# Patient Record
Sex: MALE | Race: WHITE | NOT HISPANIC OR LATINO
[De-identification: names, ages, dates, MRNs, and addresses within clinical notes are randomized per-mention and may not be internally consistent; named-entity substitution may affect disease eponyms.]

---

## 2017-01-18 ENCOUNTER — APPOINTMENT (OUTPATIENT)
Dept: GASTROENTEROLOGY | Facility: CLINIC | Age: 42
End: 2017-01-18

## 2017-02-24 ENCOUNTER — MEDICATION RENEWAL (OUTPATIENT)
Age: 42
End: 2017-02-24

## 2017-02-24 ENCOUNTER — TRANSCRIPTION ENCOUNTER (OUTPATIENT)
Age: 42
End: 2017-02-24

## 2017-03-16 ENCOUNTER — RX RENEWAL (OUTPATIENT)
Age: 42
End: 2017-03-16

## 2017-04-19 ENCOUNTER — TRANSCRIPTION ENCOUNTER (OUTPATIENT)
Age: 42
End: 2017-04-19

## 2017-04-19 ENCOUNTER — MEDICATION RENEWAL (OUTPATIENT)
Age: 42
End: 2017-04-19

## 2017-06-26 ENCOUNTER — TRANSCRIPTION ENCOUNTER (OUTPATIENT)
Age: 42
End: 2017-06-26

## 2017-06-27 ENCOUNTER — MEDICATION RENEWAL (OUTPATIENT)
Age: 42
End: 2017-06-27

## 2017-09-26 ENCOUNTER — RESULT REVIEW (OUTPATIENT)
Age: 42
End: 2017-09-26

## 2017-09-29 ENCOUNTER — APPOINTMENT (OUTPATIENT)
Dept: INTERNAL MEDICINE | Facility: CLINIC | Age: 42
End: 2017-09-29
Payer: COMMERCIAL

## 2017-09-29 VITALS
WEIGHT: 218 LBS | BODY MASS INDEX: 30.52 KG/M2 | HEIGHT: 71 IN | OXYGEN SATURATION: 97 % | HEART RATE: 80 BPM | TEMPERATURE: 97.6 F | SYSTOLIC BLOOD PRESSURE: 130 MMHG | DIASTOLIC BLOOD PRESSURE: 90 MMHG

## 2017-09-29 VITALS — SYSTOLIC BLOOD PRESSURE: 130 MMHG | DIASTOLIC BLOOD PRESSURE: 90 MMHG

## 2017-09-29 DIAGNOSIS — Z82.49 FAMILY HISTORY OF ISCHEMIC HEART DISEASE AND OTHER DISEASES OF THE CIRCULATORY SYSTEM: ICD-10-CM

## 2017-09-29 DIAGNOSIS — Z78.9 OTHER SPECIFIED HEALTH STATUS: ICD-10-CM

## 2017-09-29 DIAGNOSIS — K43.9 VENTRAL HERNIA W/OUT OBSTRUCTION OR GANGRENE: ICD-10-CM

## 2017-09-29 DIAGNOSIS — Z80.42 FAMILY HISTORY OF MALIGNANT NEOPLASM OF PROSTATE: ICD-10-CM

## 2017-09-29 LAB — SAVE SPECIMEN: NORMAL

## 2017-09-29 PROCEDURE — 94010 BREATHING CAPACITY TEST: CPT

## 2017-09-29 PROCEDURE — 90472 IMMUNIZATION ADMIN EACH ADD: CPT

## 2017-09-29 PROCEDURE — 99396 PREV VISIT EST AGE 40-64: CPT | Mod: 25

## 2017-09-29 PROCEDURE — 36415 COLL VENOUS BLD VENIPUNCTURE: CPT

## 2017-09-29 PROCEDURE — 90715 TDAP VACCINE 7 YRS/> IM: CPT

## 2017-09-29 PROCEDURE — G0008: CPT

## 2017-09-29 PROCEDURE — 90686 IIV4 VACC NO PRSV 0.5 ML IM: CPT

## 2017-09-29 PROCEDURE — 93000 ELECTROCARDIOGRAM COMPLETE: CPT

## 2017-10-03 ENCOUNTER — TRANSCRIPTION ENCOUNTER (OUTPATIENT)
Age: 42
End: 2017-10-03

## 2017-10-03 ENCOUNTER — RX RENEWAL (OUTPATIENT)
Age: 42
End: 2017-10-03

## 2017-10-03 ENCOUNTER — RESULT REVIEW (OUTPATIENT)
Age: 42
End: 2017-10-03

## 2017-10-03 LAB
25(OH)D3 SERPL-MCNC: 38.4 NG/ML
ALBUMIN SERPL ELPH-MCNC: 4.5 G/DL
ALP BLD-CCNC: 67 U/L
ALT SERPL-CCNC: 20 U/L
ANION GAP SERPL CALC-SCNC: 16 MMOL/L
APPEARANCE: CLEAR
AST SERPL-CCNC: 24 U/L
BACTERIA: NEGATIVE
BASOPHILS # BLD AUTO: 0.01 K/UL
BASOPHILS NFR BLD AUTO: 0.2 %
BILIRUB SERPL-MCNC: 0.6 MG/DL
BILIRUBIN URINE: NEGATIVE
BLOOD URINE: NEGATIVE
BUN SERPL-MCNC: 14 MG/DL
CALCIUM SERPL-MCNC: 10 MG/DL
CHLORIDE SERPL-SCNC: 98 MMOL/L
CHOLEST SERPL-MCNC: 186 MG/DL
CHOLEST/HDLC SERPL: 2.7 RATIO
CO2 SERPL-SCNC: 26 MMOL/L
COLOR: YELLOW
CREAT SERPL-MCNC: 1.18 MG/DL
EOSINOPHIL # BLD AUTO: 0.15 K/UL
EOSINOPHIL NFR BLD AUTO: 2.6 %
GLUCOSE QUALITATIVE U: NORMAL MG/DL
GLUCOSE SERPL-MCNC: 82 MG/DL
HCT VFR BLD CALC: 48.5 %
HDLC SERPL-MCNC: 68 MG/DL
HGB BLD-MCNC: 16.3 G/DL
IMM GRANULOCYTES NFR BLD AUTO: 0.2 %
KETONES URINE: NEGATIVE
LDLC SERPL CALC-MCNC: 85 MG/DL
LDLC SERPL DIRECT ASSAY-MCNC: 86 MG/DL
LEUKOCYTE ESTERASE URINE: NEGATIVE
LYMPHOCYTES # BLD AUTO: 1.73 K/UL
LYMPHOCYTES NFR BLD AUTO: 30.5 %
MAN DIFF?: NORMAL
MCHC RBC-ENTMCNC: 30.4 PG
MCHC RBC-ENTMCNC: 33.6 GM/DL
MCV RBC AUTO: 90.5 FL
MICROSCOPIC-UA: NORMAL
MONOCYTES # BLD AUTO: 0.53 K/UL
MONOCYTES NFR BLD AUTO: 9.3 %
NEUTROPHILS # BLD AUTO: 3.25 K/UL
NEUTROPHILS NFR BLD AUTO: 57.2 %
NITRITE URINE: NEGATIVE
PH URINE: 5.5
PLATELET # BLD AUTO: 241 K/UL
POTASSIUM SERPL-SCNC: 4.2 MMOL/L
PROT SERPL-MCNC: 8.1 G/DL
PROTEIN URINE: NEGATIVE MG/DL
PSA SERPL-MCNC: 1.05 NG/ML
RBC # BLD: 5.36 M/UL
RBC # FLD: 13.3 %
RED BLOOD CELLS URINE: 4 /HPF
SODIUM SERPL-SCNC: 140 MMOL/L
SPECIFIC GRAVITY URINE: 1.02
SQUAMOUS EPITHELIAL CELLS: 0 /HPF
T4 FREE SERPL-MCNC: 1.9 NG/DL
TRIGL SERPL-MCNC: 163 MG/DL
TSH SERPL-ACNC: 0.16 UIU/ML
UROBILINOGEN URINE: NORMAL MG/DL
WBC # FLD AUTO: 5.68 K/UL
WHITE BLOOD CELLS URINE: 1 /HPF

## 2017-11-27 ENCOUNTER — TRANSCRIPTION ENCOUNTER (OUTPATIENT)
Age: 42
End: 2017-11-27

## 2017-11-28 ENCOUNTER — RX RENEWAL (OUTPATIENT)
Age: 42
End: 2017-11-28

## 2017-12-22 ENCOUNTER — APPOINTMENT (OUTPATIENT)
Dept: INTERNAL MEDICINE | Facility: CLINIC | Age: 42
End: 2017-12-22
Payer: COMMERCIAL

## 2017-12-22 ENCOUNTER — LABORATORY RESULT (OUTPATIENT)
Age: 42
End: 2017-12-22

## 2017-12-22 VITALS
BODY MASS INDEX: 31.92 KG/M2 | DIASTOLIC BLOOD PRESSURE: 88 MMHG | WEIGHT: 228 LBS | SYSTOLIC BLOOD PRESSURE: 122 MMHG | HEIGHT: 71 IN | OXYGEN SATURATION: 98 % | TEMPERATURE: 97.5 F | HEART RATE: 76 BPM

## 2017-12-22 VITALS — DIASTOLIC BLOOD PRESSURE: 90 MMHG | SYSTOLIC BLOOD PRESSURE: 130 MMHG

## 2017-12-22 DIAGNOSIS — J45.909 UNSPECIFIED ASTHMA, UNCOMPLICATED: ICD-10-CM

## 2017-12-22 DIAGNOSIS — R20.2 PARESTHESIA OF SKIN: ICD-10-CM

## 2017-12-22 PROCEDURE — 36415 COLL VENOUS BLD VENIPUNCTURE: CPT

## 2017-12-22 PROCEDURE — 99214 OFFICE O/P EST MOD 30 MIN: CPT | Mod: 25

## 2017-12-22 RX ORDER — ALBUTEROL SULFATE 90 UG/1
108 (90 BASE) AEROSOL, METERED RESPIRATORY (INHALATION)
Qty: 8 | Refills: 0 | Status: ACTIVE | COMMUNITY
Start: 2017-08-27

## 2017-12-27 RX ORDER — LEVOTHYROXINE SODIUM 0.17 MG/1
175 TABLET ORAL
Qty: 30 | Refills: 0 | Status: DISCONTINUED | COMMUNITY
Start: 2017-09-26 | End: 2017-12-27

## 2018-01-02 LAB
T4 FREE SERPL-MCNC: 1.9 NG/DL
TSH SERPL-ACNC: 0.29 UIU/ML
VIT B12 SERPL-MCNC: 461 PG/ML

## 2018-01-25 PROBLEM — Z92.29 HISTORY OF INFLUENZA VACCINATION: Status: RESOLVED | Noted: 2017-09-29 | Resolved: 2018-01-25

## 2018-01-25 PROBLEM — Z23 NEED FOR TDAP VACCINATION: Status: RESOLVED | Noted: 2017-09-29 | Resolved: 2018-01-25

## 2018-01-26 ENCOUNTER — LABORATORY RESULT (OUTPATIENT)
Age: 43
End: 2018-01-26

## 2018-01-26 ENCOUNTER — APPOINTMENT (OUTPATIENT)
Dept: INTERNAL MEDICINE | Facility: CLINIC | Age: 43
End: 2018-01-26
Payer: COMMERCIAL

## 2018-01-26 VITALS
SYSTOLIC BLOOD PRESSURE: 130 MMHG | BODY MASS INDEX: 31.64 KG/M2 | HEIGHT: 71 IN | OXYGEN SATURATION: 98 % | WEIGHT: 226 LBS | HEART RATE: 90 BPM | TEMPERATURE: 96.9 F | DIASTOLIC BLOOD PRESSURE: 98 MMHG

## 2018-01-26 DIAGNOSIS — Z92.29 PERSONAL HISTORY OF OTHER DRUG THERAPY: ICD-10-CM

## 2018-01-26 DIAGNOSIS — Z23 ENCOUNTER FOR IMMUNIZATION: ICD-10-CM

## 2018-01-26 PROCEDURE — 36415 COLL VENOUS BLD VENIPUNCTURE: CPT

## 2018-01-26 PROCEDURE — 99214 OFFICE O/P EST MOD 30 MIN: CPT | Mod: 25

## 2018-01-26 PROCEDURE — 93000 ELECTROCARDIOGRAM COMPLETE: CPT

## 2018-01-29 LAB
T4 FREE SERPL-MCNC: 1.4 NG/DL
TSH SERPL-ACNC: 2.08 UIU/ML

## 2018-03-02 ENCOUNTER — APPOINTMENT (OUTPATIENT)
Dept: INTERNAL MEDICINE | Facility: CLINIC | Age: 43
End: 2018-03-02

## 2018-07-25 PROBLEM — Z78.9 ALCOHOL USE: Status: ACTIVE | Noted: 2017-09-29

## 2018-08-01 DIAGNOSIS — K29.70 GASTRITIS, UNSPECIFIED, W/OUT BLEEDING: ICD-10-CM

## 2019-01-22 ENCOUNTER — RX RENEWAL (OUTPATIENT)
Age: 44
End: 2019-01-22

## 2019-05-13 ENCOUNTER — TRANSCRIPTION ENCOUNTER (OUTPATIENT)
Age: 44
End: 2019-05-13

## 2019-05-17 ENCOUNTER — OTHER (OUTPATIENT)
Age: 44
End: 2019-05-17

## 2019-05-17 ENCOUNTER — APPOINTMENT (OUTPATIENT)
Dept: INTERNAL MEDICINE | Facility: CLINIC | Age: 44
End: 2019-05-17
Payer: COMMERCIAL

## 2019-05-17 ENCOUNTER — LABORATORY RESULT (OUTPATIENT)
Age: 44
End: 2019-05-17

## 2019-05-17 LAB — SAVE SPECIMEN: NORMAL

## 2019-05-17 PROCEDURE — 36415 COLL VENOUS BLD VENIPUNCTURE: CPT

## 2019-05-20 LAB
25(OH)D3 SERPL-MCNC: 38 NG/ML
ALBUMIN SERPL ELPH-MCNC: 4.6 G/DL
ALP BLD-CCNC: 66 U/L
ALT SERPL-CCNC: 27 U/L
ANION GAP SERPL CALC-SCNC: 16 MMOL/L
APPEARANCE: CLEAR
AST SERPL-CCNC: 23 U/L
BACTERIA: NEGATIVE
BASOPHILS # BLD AUTO: 0.03 K/UL
BASOPHILS NFR BLD AUTO: 0.5 %
BILIRUB SERPL-MCNC: 0.5 MG/DL
BILIRUBIN URINE: NEGATIVE
BLOOD URINE: NEGATIVE
BUN SERPL-MCNC: 19 MG/DL
CALCIUM SERPL-MCNC: 10.1 MG/DL
CHLORIDE SERPL-SCNC: 103 MMOL/L
CHOLEST SERPL-MCNC: 178 MG/DL
CHOLEST/HDLC SERPL: 2.9 RATIO
CO2 SERPL-SCNC: 24 MMOL/L
COLOR: YELLOW
CREAT SERPL-MCNC: 1.14 MG/DL
EOSINOPHIL # BLD AUTO: 0.1 K/UL
EOSINOPHIL NFR BLD AUTO: 1.7 %
GLUCOSE QUALITATIVE U: NEGATIVE
GLUCOSE SERPL-MCNC: 93 MG/DL
HCT VFR BLD CALC: 50.7 %
HDLC SERPL-MCNC: 62 MG/DL
HGB BLD-MCNC: 16.6 G/DL
HYALINE CASTS: 0 /LPF
IMM GRANULOCYTES NFR BLD AUTO: 0.2 %
KETONES URINE: NEGATIVE
LDLC SERPL CALC-MCNC: 88 MG/DL
LEUKOCYTE ESTERASE URINE: NEGATIVE
LYMPHOCYTES # BLD AUTO: 2.42 K/UL
LYMPHOCYTES NFR BLD AUTO: 42.2 %
MAN DIFF?: NORMAL
MCHC RBC-ENTMCNC: 29.5 PG
MCHC RBC-ENTMCNC: 32.7 GM/DL
MCV RBC AUTO: 90.1 FL
MICROSCOPIC-UA: NORMAL
MONOCYTES # BLD AUTO: 0.46 K/UL
MONOCYTES NFR BLD AUTO: 8 %
NEUTROPHILS # BLD AUTO: 2.71 K/UL
NEUTROPHILS NFR BLD AUTO: 47.4 %
NITRITE URINE: NEGATIVE
PH URINE: 6.5
PLATELET # BLD AUTO: 232 K/UL
POTASSIUM SERPL-SCNC: 4.2 MMOL/L
PROT SERPL-MCNC: 7.7 G/DL
PROTEIN URINE: NORMAL
RBC # BLD: 5.63 M/UL
RBC # FLD: 12.4 %
RED BLOOD CELLS URINE: 2 /HPF
SODIUM SERPL-SCNC: 143 MMOL/L
SPECIFIC GRAVITY URINE: 1.03
SQUAMOUS EPITHELIAL CELLS: 0 /HPF
T4 FREE SERPL-MCNC: 1.5 NG/DL
TRIGL SERPL-MCNC: 141 MG/DL
TSH SERPL-ACNC: 1.62 UIU/ML
UROBILINOGEN URINE: NORMAL
WBC # FLD AUTO: 5.73 K/UL
WHITE BLOOD CELLS URINE: 0 /HPF

## 2019-05-23 ENCOUNTER — APPOINTMENT (OUTPATIENT)
Dept: INTERNAL MEDICINE | Facility: CLINIC | Age: 44
End: 2019-05-23
Payer: COMMERCIAL

## 2019-05-23 VITALS
BODY MASS INDEX: 33.32 KG/M2 | TEMPERATURE: 98 F | HEART RATE: 79 BPM | WEIGHT: 238 LBS | DIASTOLIC BLOOD PRESSURE: 92 MMHG | OXYGEN SATURATION: 97 % | SYSTOLIC BLOOD PRESSURE: 130 MMHG | HEIGHT: 71 IN

## 2019-05-23 VITALS — DIASTOLIC BLOOD PRESSURE: 90 MMHG | SYSTOLIC BLOOD PRESSURE: 120 MMHG

## 2019-05-23 DIAGNOSIS — Z11.4 ENCOUNTER FOR SCREENING FOR HUMAN IMMUNODEFICIENCY VIRUS [HIV]: ICD-10-CM

## 2019-05-23 DIAGNOSIS — Z87.898 PERSONAL HISTORY OF OTHER SPECIFIED CONDITIONS: ICD-10-CM

## 2019-05-23 DIAGNOSIS — R03.0 ELEVATED BLOOD-PRESSURE READING, W/OUT DIAGNOSIS OF HYPERTENSION: ICD-10-CM

## 2019-05-23 DIAGNOSIS — Z87.01 PERSONAL HISTORY OF PNEUMONIA (RECURRENT): ICD-10-CM

## 2019-05-23 PROCEDURE — 94010 BREATHING CAPACITY TEST: CPT

## 2019-05-23 PROCEDURE — 93000 ELECTROCARDIOGRAM COMPLETE: CPT

## 2019-05-23 PROCEDURE — 99396 PREV VISIT EST AGE 40-64: CPT | Mod: 25

## 2019-05-23 RX ORDER — RAMIPRIL 5 MG/1
5 CAPSULE ORAL
Qty: 30 | Refills: 0 | Status: DISCONTINUED | COMMUNITY
Start: 2018-01-26 | End: 2019-05-23

## 2019-05-23 RX ORDER — AZITHROMYCIN 250 MG/1
250 TABLET, FILM COATED ORAL
Qty: 6 | Refills: 0 | Status: DISCONTINUED | COMMUNITY
Start: 2018-01-19 | End: 2019-05-23

## 2019-05-23 NOTE — HISTORY OF PRESENT ILLNESS
[FreeTextEntry1] : CPE [de-identified] : vaccine- up to date\par diet- eating healthy- needs to eat more veg\par exercise- no\par HTN- pt has not taken BP meds. \par fatigue- wakes up tired.  pt has ISMAEL- has mandibular device. \par sexual dysfunction- prob w erection- several yr. - no prob w erection w masturbation.\par asthma- no exaccerbation\par dyspnea-at baseline for 3-4 yr.  mainly when walking [place] stairs- when he was more active he was able to climb stairs\par irritable bowel- had abd bloating,  loose bm- went to seen his GI- pt states he was placed on PPI and EGD.  colonscopy 1/2018-nl\par last episode of dizziness - 1-2mo ago

## 2019-05-23 NOTE — PHYSICAL EXAM
[No Acute Distress] : no acute distress [Well Nourished] : well nourished [Well Developed] : well developed [Well-Appearing] : well-appearing [Normal Sclera/Conjunctiva] : normal sclera/conjunctiva [PERRL] : pupils equal round and reactive to light [Normal Outer Ear/Nose] : the outer ears and nose were normal in appearance [Normal Oropharynx] : the oropharynx was normal [Normal TMs] : both tympanic membranes were normal [Supple] : supple [No Lymphadenopathy] : no lymphadenopathy [Thyroid Normal, No Nodules] : the thyroid was normal and there were no nodules present [No Respiratory Distress] : no respiratory distress  [Clear to Auscultation] : lungs were clear to auscultation bilaterally [No Accessory Muscle Use] : no accessory muscle use [Normal Rate] : normal rate  [Regular Rhythm] : with a regular rhythm [Normal S1, S2] : normal S1 and S2 [No Murmur] : no murmur heard [No Edema] : there was no peripheral edema [Soft] : abdomen soft [Non Tender] : non-tender [Non-distended] : non-distended [No Masses] : no abdominal mass palpated [Normal Bowel Sounds] : normal bowel sounds [Normal Supraclavicular Nodes] : no supraclavicular lymphadenopathy [Normal Axillary Nodes] : no axillary lymphadenopathy [Normal Posterior Cervical Nodes] : no posterior cervical lymphadenopathy [Normal Anterior Cervical Nodes] : no anterior cervical lymphadenopathy [Normal Inguinal Nodes] : no inguinal lymphadenopathy [Grossly Normal Strength/Tone] : grossly normal strength/tone [Coordination Grossly Intact] : coordination grossly intact [No Focal Deficits] : no focal deficits [Normal Affect] : the affect was normal [Normal Insight/Judgement] : insight and judgment were intact [Testes Tenderness] : no tenderness of the testes [Testes Mass (___cm)] : there were no testicular masses [Prostate Tenderness] : the prostate was not tender [No Prostate Nodules] : no prostate nodules [FreeTextEntry1] : stool guaic neg. prostate- slightly enlargedl

## 2019-05-23 NOTE — PLAN
[FreeTextEntry1] : EKG- NSR 87\par spirometry -nl\par pt wants to hiv test mailed to him\par abd bloating- trial period off dairy prod\par HTN- restart ramipril\par use proair before going to work

## 2019-05-23 NOTE — HEALTH RISK ASSESSMENT
[0] : 2) Feeling down, depressed, or hopeless: Not at all (0) [HIV Test offered] : HIV Test offered [Patient reported colonoscopy was normal] : Patient reported colonoscopy was normal [ColonoscopyDate] : 01/2018 [de-identified] : last seen optometry - last  [de-identified] : last seen dentist 2wk ago

## 2019-05-24 LAB — HIV1+2 AB SPEC QL IA.RAPID: NONREACTIVE

## 2019-07-17 ENCOUNTER — NON-APPOINTMENT (OUTPATIENT)
Age: 44
End: 2019-07-17

## 2019-07-17 ENCOUNTER — APPOINTMENT (OUTPATIENT)
Dept: INTERNAL MEDICINE | Facility: CLINIC | Age: 44
End: 2019-07-17
Payer: COMMERCIAL

## 2019-07-17 VITALS
HEART RATE: 82 BPM | BODY MASS INDEX: 33.32 KG/M2 | WEIGHT: 238 LBS | SYSTOLIC BLOOD PRESSURE: 134 MMHG | DIASTOLIC BLOOD PRESSURE: 92 MMHG | HEIGHT: 71 IN

## 2019-07-17 PROCEDURE — 93351 STRESS TTE COMPLETE: CPT

## 2019-07-17 PROCEDURE — 99203 OFFICE O/P NEW LOW 30 MIN: CPT | Mod: 25

## 2019-07-17 PROCEDURE — ZZZZZ: CPT

## 2019-07-17 PROCEDURE — 99213 OFFICE O/P EST LOW 20 MIN: CPT | Mod: 25

## 2019-07-17 NOTE — PHYSICAL EXAM
[General Appearance - Well Developed] : well developed [Normal Appearance] : normal appearance [Well Groomed] : well groomed [General Appearance - Well Nourished] : well nourished [No Deformities] : no deformities [General Appearance - In No Acute Distress] : no acute distress [Normal Conjunctiva] : the conjunctiva exhibited no abnormalities [Eyelids - No Xanthelasma] : the eyelids demonstrated no xanthelasmas [Normal Oral Mucosa] : normal oral mucosa [No Oral Pallor] : no oral pallor [No Oral Cyanosis] : no oral cyanosis [Normal Jugular Venous A Waves Present] : normal jugular venous A waves present [Normal Jugular Venous V Waves Present] : normal jugular venous V waves present [No Jugular Venous Galindo A Waves] : no jugular venous galindo A waves [Respiration, Rhythm And Depth] : normal respiratory rhythm and effort [Exaggerated Use Of Accessory Muscles For Inspiration] : no accessory muscle use [Auscultation Breath Sounds / Voice Sounds] : lungs were clear to auscultation bilaterally [Abdomen Soft] : soft [Abdomen Tenderness] : non-tender [Abdomen Mass (___ Cm)] : no abdominal mass palpated [Abnormal Walk] : normal gait [Gait - Sufficient For Exercise Testing] : the gait was sufficient for exercise testing [Nail Clubbing] : no clubbing of the fingernails [Cyanosis, Localized] : no localized cyanosis [Petechial Hemorrhages (___cm)] : no petechial hemorrhages [] : no ischemic changes

## 2019-07-17 NOTE — REASON FOR VISIT
[Follow-Up - Clinic] : a clinic follow-up of [Dyspnea] : dyspnea [FreeTextEntry1] : \par \par here for stress echo\par \par noted SOB climbing [place] stairs (about 2 flights) in past few mos.;\par no accompanying chest pain\par \par risk factors ---overweight\par                        FH====SCD in mat. grandfather; thinks hwe was in "60's"\par                        hypertension---intermittent,  "borderline" BPs on ramipril\par                        LDL cholesterol =88, most recent check\par                        nonsmoker\par                        not diabetic\par                        hypertriglyceridemia--most recent lipid panel, normal triglycerides\par \par exercises sporadically on elliptical; does 30 minutes (asx.)

## 2019-07-17 NOTE — ASSESSMENT
[FreeTextEntry1] : \par \par stress echo---neg. for myocardial ischemia; isolated VPBs rest and exercise\par \par imp---dyspnea on exertion---stress echo as above; probably related to relative deconditioning\par          and obesity\par          hypertension---mildly hypertensive at rest with hypertensive response to exercise\par          hypertriglyceridemia--most recently normal triglyceride level; LDL as above\par          obesity----BMI=33.2\par \par plan---stress echo findings reviewed with pt.\par            encouraged to use elliptical on regular basis.....30 minutes, 5 days per week\par            dietary counseling re: weight loss\par            advised OV with Dr. Kelly for BP check in 3 mos.\par            agree with ISMAEL evaluation

## 2019-09-18 ENCOUNTER — APPOINTMENT (OUTPATIENT)
Dept: PULMONOLOGY | Facility: CLINIC | Age: 44
End: 2019-09-18
Payer: COMMERCIAL

## 2019-09-18 VITALS
SYSTOLIC BLOOD PRESSURE: 146 MMHG | OXYGEN SATURATION: 97 % | BODY MASS INDEX: 32.9 KG/M2 | DIASTOLIC BLOOD PRESSURE: 103 MMHG | RESPIRATION RATE: 15 BRPM | HEART RATE: 90 BPM | TEMPERATURE: 98 F | WEIGHT: 235 LBS | HEIGHT: 71 IN

## 2019-09-18 VITALS — SYSTOLIC BLOOD PRESSURE: 159 MMHG | DIASTOLIC BLOOD PRESSURE: 117 MMHG

## 2019-09-18 PROCEDURE — 99204 OFFICE O/P NEW MOD 45 MIN: CPT | Mod: GC

## 2019-09-18 NOTE — PHYSICAL EXAM
[General Appearance - Well Developed] : well developed [Normal Appearance] : normal appearance [General Appearance - Well Nourished] : well nourished [Well Groomed] : well groomed [No Deformities] : no deformities [General Appearance - In No Acute Distress] : no acute distress [Eyelids - No Xanthelasma] : the eyelids demonstrated no xanthelasmas [Normal Conjunctiva] : the conjunctiva exhibited no abnormalities [Low Lying Soft Palate] : low lying soft palate [Neck Appearance] : the appearance of the neck was normal [Neck Cervical Mass (___cm)] : no neck mass was observed [Jugular Venous Distention Increased] : there was no jugular-venous distention [Thyroid Diffuse Enlargement] : the thyroid was not enlarged [Thyroid Nodule] : there were no palpable thyroid nodules [Heart Rate And Rhythm] : heart rate was normal and rhythm regular [Heart Sounds] : normal S1 and S2 [Heart Sounds Gallop] : no gallops [Heart Sounds Pericardial Friction Rub] : no pericardial rub [Murmurs] : no murmurs [Auscultation Breath Sounds / Voice Sounds] : lungs were clear to auscultation bilaterally [Abnormal Walk] : normal gait [Musculoskeletal - Swelling] : no joint swelling seen [Motor Tone] : muscle strength and tone were normal [Nail Clubbing] : no clubbing of the fingernails [Cyanosis, Localized] : no localized cyanosis [Petechial Hemorrhages (___cm)] : no petechial hemorrhages [Skin Color & Pigmentation] : normal skin color and pigmentation [Skin Turgor] : normal skin turgor [] : no rash [Deep Tendon Reflexes (DTR)] : deep tendon reflexes were 2+ and symmetric [Sensation] : the sensory exam was normal to light touch and pinprick [No Focal Deficits] : no focal deficits [Oriented To Time, Place, And Person] : oriented to person, place, and time [Impaired Insight] : insight and judgment were intact [Affect] : the affect was normal

## 2019-09-18 NOTE — ASSESSMENT
[FreeTextEntry1] : 45 y/o M with PMH of mild ISMAEL on OAT, asthma, hypothyroid presented to the office for initial evaluation.  Patient was d/w ISMAEL in 2015 with an AHI of 8 on an HST.  He was fitted for OAT and notes initial improvement but now has worsesnign daytime somnolence.  He has an ESS of 14 on today's visit. He has a low lying soft palate on exam. He has hypothyroidism which he says is well controlled on L-thyroxine.  He has gained 10-15 lbs since last study. In office, he was noted to have elevated high blood pressure which he states is not like him, but patient was counseled to measure pressures on his own and notify his PCP.  The patient was referred to the [facility] for diagnostic polysomnography for further assessment. The ramifications of obstructive sleep apnea and its potential therapeutic modalities were discussed with the patient. The dangers of drowsy driving were discussed with the patient.  The patient was warned to avoid drowsy driving. \par \par Álvaro Lee, DO\par Sleep Fellow

## 2019-09-18 NOTE — HISTORY OF PRESENT ILLNESS
[Snoring] : snoring [Obstructive Sleep Apnea] : obstructive sleep apnea [Witnessed Apneas] : witnessed sleep apnea [Frequent Nocturnal Awakening] : frequent nocturnal awakening [ESS: ___] : ESS score [unfilled] [Date: ___] : Date of most recent diagnostic polysomnogram: [unfilled] [Awakening With Dry Mouth] : awakening with dry mouth [AHI: ___ per hour] : Apnea-hypopnea index:  [unfilled] per hour [FreeTextEntry1] : 45 y/o M with PMH of mild ISMAEL on OAT, asthma, hypothyroid presented to the office for initial evaluation.\par \par Of note, patient was noted to be snoring and went for HST in 2015 which showed mild disease.  He was fitted for OAT and is still using it.  He states his snoring has returned and his symptoms of daytime fatigue have worsened.  He goes to sleep at 11p and wakes up at 6a. Has 1-2 awakenings and awakens unrefreshed. Since 2015, he notes that he has gained about 10-15 pounds.

## 2019-09-18 NOTE — CONSULT LETTER
[Dear  ___] : Dear  [unfilled], [Consult Letter:] : I had the pleasure of evaluating your patient, [unfilled]. [Consult Closing:] : Thank you very much for allowing me to participate in the care of this patient.  If you have any questions, please do not hesitate to contact me. [Please see my note below.] : Please see my note below. [Sincerely,] : Sincerely, [FreeTextEntry3] : Sanju Espino MD, FAASM, FCCP\par Medical Director\par [facility] [place]

## 2019-09-18 NOTE — REVIEW OF SYSTEMS
normal (ped)... [EDS: ESS=____] : daytime somnolence: ESS=[unfilled] [Fatigue] : fatigue [Recent Wt Gain (___ Lbs)] : recent [unfilled] ~Ulb weight gain [Snoring] : snoring [Obesity] : obesity [Witnessed Apneas] : witnessed apnea [Thyroid Disease] : thyroid disease [Negative] : Psychiatric

## 2019-09-30 ENCOUNTER — FORM ENCOUNTER (OUTPATIENT)
Age: 44
End: 2019-09-30

## 2019-12-02 ENCOUNTER — APPOINTMENT (OUTPATIENT)
Dept: PULMONOLOGY | Facility: CLINIC | Age: 44
End: 2019-12-02

## 2020-01-20 ENCOUNTER — RX RENEWAL (OUTPATIENT)
Age: 45
End: 2020-01-20

## 2020-05-10 ENCOUNTER — NON-APPOINTMENT (OUTPATIENT)
Age: 45
End: 2020-05-10

## 2020-09-06 ENCOUNTER — RX RENEWAL (OUTPATIENT)
Age: 45
End: 2020-09-06

## 2020-09-07 ENCOUNTER — RX RENEWAL (OUTPATIENT)
Age: 45
End: 2020-09-07

## 2020-09-21 ENCOUNTER — TRANSCRIPTION ENCOUNTER (OUTPATIENT)
Age: 45
End: 2020-09-21

## 2020-09-22 ENCOUNTER — TRANSCRIPTION ENCOUNTER (OUTPATIENT)
Age: 45
End: 2020-09-22

## 2020-09-25 ENCOUNTER — APPOINTMENT (OUTPATIENT)
Dept: DISASTER EMERGENCY | Facility: CLINIC | Age: 45
End: 2020-09-25

## 2020-09-26 LAB — SARS-COV-2 N GENE NPH QL NAA+PROBE: NOT DETECTED

## 2020-09-28 ENCOUNTER — OUTPATIENT (OUTPATIENT)
Dept: OUTPATIENT SERVICES | Facility: HOSPITAL | Age: 45
LOS: 1 days | End: 2020-09-28
Payer: COMMERCIAL

## 2020-09-28 ENCOUNTER — APPOINTMENT (OUTPATIENT)
Dept: SLEEP CENTER | Facility: CLINIC | Age: 45
End: 2020-09-28
Payer: COMMERCIAL

## 2020-09-28 PROCEDURE — 95810 POLYSOM 6/> YRS 4/> PARAM: CPT

## 2020-09-28 PROCEDURE — 95810 POLYSOM 6/> YRS 4/> PARAM: CPT | Mod: 26

## 2020-09-29 DIAGNOSIS — G47.33 OBSTRUCTIVE SLEEP APNEA (ADULT) (PEDIATRIC): ICD-10-CM

## 2020-10-20 ENCOUNTER — NON-APPOINTMENT (OUTPATIENT)
Age: 45
End: 2020-10-20

## 2020-10-27 ENCOUNTER — LABORATORY RESULT (OUTPATIENT)
Age: 45
End: 2020-10-27

## 2020-10-27 ENCOUNTER — APPOINTMENT (OUTPATIENT)
Dept: INTERNAL MEDICINE | Facility: CLINIC | Age: 45
End: 2020-10-27
Payer: COMMERCIAL

## 2020-10-27 PROCEDURE — 36415 COLL VENOUS BLD VENIPUNCTURE: CPT

## 2020-10-27 PROCEDURE — 99072 ADDL SUPL MATRL&STAF TM PHE: CPT

## 2020-10-28 LAB — PSA SERPL-MCNC: 1.01 NG/ML

## 2020-11-02 ENCOUNTER — NON-APPOINTMENT (OUTPATIENT)
Age: 45
End: 2020-11-02

## 2020-11-02 ENCOUNTER — APPOINTMENT (OUTPATIENT)
Dept: INTERNAL MEDICINE | Facility: CLINIC | Age: 45
End: 2020-11-02
Payer: COMMERCIAL

## 2020-11-02 VITALS
OXYGEN SATURATION: 99 % | WEIGHT: 222 LBS | HEART RATE: 105 BPM | TEMPERATURE: 97.6 F | DIASTOLIC BLOOD PRESSURE: 98 MMHG | SYSTOLIC BLOOD PRESSURE: 160 MMHG | BODY MASS INDEX: 31.08 KG/M2 | HEIGHT: 71 IN

## 2020-11-02 DIAGNOSIS — R06.00 DYSPNEA, UNSPECIFIED: ICD-10-CM

## 2020-11-02 DIAGNOSIS — Z86.69 PERSONAL HISTORY OF OTHER DISEASES OF THE NERVOUS SYSTEM AND SENSE ORGANS: ICD-10-CM

## 2020-11-02 DIAGNOSIS — Z01.818 ENCOUNTER FOR OTHER PREPROCEDURAL EXAMINATION: ICD-10-CM

## 2020-11-02 PROCEDURE — 99072 ADDL SUPL MATRL&STAF TM PHE: CPT

## 2020-11-02 PROCEDURE — 93000 ELECTROCARDIOGRAM COMPLETE: CPT

## 2020-11-02 PROCEDURE — 99396 PREV VISIT EST AGE 40-64: CPT | Mod: 25

## 2020-11-02 RX ORDER — BUDESONIDE AND FORMOTEROL FUMARATE DIHYDRATE 80; 4.5 UG/1; UG/1
80-4.5 AEROSOL RESPIRATORY (INHALATION)
Refills: 0 | Status: ACTIVE | COMMUNITY

## 2020-11-02 RX ORDER — BIFIDOBACTERIUM LONGUM 10MM CELL
CAPSULE ORAL
Refills: 0 | Status: ACTIVE | COMMUNITY

## 2020-11-02 RX ORDER — MOMETASONE FUROATE AND FORMOTEROL FUMARATE DIHYDRATE 100; 5 UG/1; UG/1
100-5 AEROSOL RESPIRATORY (INHALATION)
Refills: 0 | Status: DISCONTINUED | COMMUNITY
End: 2020-11-02

## 2020-11-02 NOTE — HEALTH RISK ASSESSMENT
[Yes] : Yes [Monthly or less (1 pt)] : Monthly or less (1 point) [1 or 2 (0 pts)] : 1 or 2 (0 points) [Never (0 pts)] : Never (0 points) [No] : In the past 12 months have you used drugs other than those required for medical reasons? No [0] : 2) Feeling down, depressed, or hopeless: Not at all (0) [Patient reported colonoscopy was normal] : Patient reported colonoscopy was normal [HIV test declined] : HIV test declined [Hepatitis C test declined] : Hepatitis C test declined [With Patient/Caregiver] : With Patient/Caregiver [Designated Healthcare Proxy] : Designated healthcare proxy [Relationship: ___] : Relationship: [unfilled] [] : No [Reports changes in vision] : Reports no changes in vision [ColonoscopyDate] : 01/18 [de-identified] : wears glasses.  seen optometrist- 06/20 [de-identified] : seen dentist 07/20 [AdvancecareDate] : 10/20

## 2020-11-02 NOTE — HISTORY OF PRESENT ILLNESS
[FreeTextEntry1] : CPE [de-identified] : vaccine- had flu vac\par diet-reviewed diet w pt\par exercise- exercise bike- 5-6/wk - 30 min\par HTN- not taken med for 1 yr.\par asthma- seen allergist this AM.  mild dyspnea, fatigue during exercise bike but afterwards he feels fine\par mild ISMAEL- has appt w sleep next wk.  has fatigue but is sleeping enough\par 10/20- labs- elev trigly- ate lots of candy the day prior\par 7/18- EGD- eosinoph esoph vs reflux esoph- belching freq\par seen GI over this summer for IBS and has to wipe multiple times- soft stool- better w FD guard or align

## 2020-11-02 NOTE — PHYSICAL EXAM
[Normal Sphincter Tone] : normal sphincter tone [No Mass] : no mass [Prostate Enlargement] : the prostate was not enlarged [Prostate Tenderness] : the prostate was not tender [No Prostate Nodules] : no prostate nodules [No Acute Distress] : no acute distress [Well Nourished] : well nourished [Well Developed] : well developed [Well-Appearing] : well-appearing [Normal Sclera/Conjunctiva] : normal sclera/conjunctiva [PERRL] : pupils equal round and reactive to light [Normal Outer Ear/Nose] : the outer ears and nose were normal in appearance [Normal Oropharynx] : the oropharynx was normal [Normal TMs] : both tympanic membranes were normal [No Lymphadenopathy] : no lymphadenopathy [Supple] : supple [Thyroid Normal, No Nodules] : the thyroid was normal and there were no nodules present [No Respiratory Distress] : no respiratory distress  [No Accessory Muscle Use] : no accessory muscle use [Clear to Auscultation] : lungs were clear to auscultation bilaterally [Normal Rate] : normal rate  [Regular Rhythm] : with a regular rhythm [Normal S1, S2] : normal S1 and S2 [No Murmur] : no murmur heard [No Carotid Bruits] : no carotid bruits [No Edema] : there was no peripheral edema [Soft] : abdomen soft [Non Tender] : non-tender [Non-distended] : non-distended [No Masses] : no abdominal mass palpated [Normal Bowel Sounds] : normal bowel sounds [Stool Occult Blood] : stool negative for occult blood [Normal Supraclavicular Nodes] : no supraclavicular lymphadenopathy [Normal Axillary Nodes] : no axillary lymphadenopathy [Normal Posterior Cervical Nodes] : no posterior cervical lymphadenopathy [Normal Anterior Cervical Nodes] : no anterior cervical lymphadenopathy [Normal Inguinal Nodes] : no inguinal lymphadenopathy [Grossly Normal Strength/Tone] : grossly normal strength/tone [No Focal Deficits] : no focal deficits [Normal Gait] : normal gait [Normal Affect] : the affect was normal [Normal Insight/Judgement] : insight and judgment were intact [FreeTextEntry1] : heme neg stool

## 2020-11-12 ENCOUNTER — APPOINTMENT (OUTPATIENT)
Dept: PULMONOLOGY | Facility: CLINIC | Age: 45
End: 2020-11-12

## 2020-11-12 ENCOUNTER — APPOINTMENT (OUTPATIENT)
Dept: PULMONOLOGY | Facility: CLINIC | Age: 45
End: 2020-11-12
Payer: COMMERCIAL

## 2020-11-12 VITALS
HEART RATE: 105 BPM | TEMPERATURE: 97.8 F | DIASTOLIC BLOOD PRESSURE: 96 MMHG | BODY MASS INDEX: 31.92 KG/M2 | SYSTOLIC BLOOD PRESSURE: 149 MMHG | WEIGHT: 228 LBS | RESPIRATION RATE: 16 BRPM | HEIGHT: 71 IN

## 2020-11-12 PROCEDURE — 99214 OFFICE O/P EST MOD 30 MIN: CPT

## 2020-11-12 PROCEDURE — 99072 ADDL SUPL MATRL&STAF TM PHE: CPT

## 2020-11-12 NOTE — HISTORY OF PRESENT ILLNESS
[Daytime Somnolence] : daytime somnolence [ESS: ___] : ESS score [unfilled] [Unintentional Sleep While Inactive] : unintentional sleep while inactive [Awakes with Headache] : headache upon awakening [Hypersomnolence] : hypersomnolence [To Bed: ___] : ~he/she~ goes to bed at [unfilled] [Arises: ___] : arises at [unfilled] [Sleep Onset Latency: ___ minutes] : sleep onset latency of [unfilled] minutes reported [Nocturnal Awakenings: ___] : ~he/she~ typically has [unfilled] nocturnal awakenings [WASO: ___] : Wake time after sleep onset is [unfilled] [TST: ___] : Total sleep time is [unfilled] [Daytime Sleep: ___] : daytime sleep: [unfilled] [FreeTextEntry1] : This is a 45 year old male with overall mild ISMAEL, moderate during REM sleep, on OMAD, presenting for a follow-up appointment to discuss efficacy of OMAD and alternative therapies. \par \par Patient was previously diagnosed with mild ISMAEL (AHI 8/hr) in 2015 and began treatment with OAT. He underwent a Dx PSG on 10/1/2019 for experiencing a recurrence in his symptoms of snoring, and EDS that showed an overall AHI of 8.7/hr; REM AHI of 20.8/hr and supine AHI of 17.1/hr, with a T 90 of 0.2. % PSG with OAT from 9/28/20 showed an increase of sleep disordered breathing events that were not effectively managed with OAT --AHI 21.3/hr; REM AHI of 49.8/hr, with greater frequency of SDBE in the supine position--.9/hr. He uses OAT nightly but continues to endorse unintentional sleep episodes primarily while inactive, has un-refreshed awakenings, and AM headaches. He denies drowsy driving. Sleeps 6-7 hrs/night. Denies DIS and DMS.\par \par Of note, patient has DNS and sinus issues--was evaluated by ENT and underwent a balloon procedure with good results.\par \par No significant changes to his health and weight reported. \par \par COMORBID medical conditions include: HTN, Asthma (on Symbicort and ProAir--managed by allergist Dr. Parish), Hypothyroidism [Snoring] : no snoring [Witnessed Apneas] : no witnessed sleep apnea [Unintentional Sleep while Active] : no unintentional sleep while active [Awakes Unrefreshed] : does not awake unrefreshed [Awakening With Dry Mouth] : no dry mouth upon awakening [Recent  Weight Gain] : no recent weight gain [DIS] : no DIS [DMS] : no DMS [Unusual Sleep Behavior] : no unusual sleep behavior [Cataplexy] : no cataplexy [Sleep Paralysis] : no sleep paralysis [Hypnagogic Hallucinations] : no hallucinations when falling asleep [Hypnopompic Hallucinations] : no hallucinations when awakening [Lower Extremity Discomfort] : no lower extremity discomfort in evening or at bedtime

## 2020-11-12 NOTE — ASSESSMENT
[FreeTextEntry1] : 44 y/o male diagnosed with overall mild ISMAEL, moderate during REM sleep (AHI 8.7/hr; REM AHI 20.8/hr), presents to discuss efficacy of OAT device and alternative treatment options. PSG with OAT from 9/28/20 showed an increase of sleep disordered breathing events that were not effectively managed with OAT --AHI 21.3/hr; REM AHI of 49.8/hr, with greater frequency of SDBE in the supine position. The patient remains symptomatic with EDS with ESS score of 15, awakens with headaches, and feels un-refreshed in the mornings, despite sleeping 6-7 hrs nightly with OAT. \par \par -Treatment modalities including CPAP, further adjustment of his OMAD device, along with positional therapy were discussed. Patient is willing to try a trial of PAP therapy. \par -Therefore, he was referred to [facility] for a CPAP Titration study.\par  Of note, patient has DNS and sinus issues. He was seen by ENT and underwent balloon procedure with good results.\par The ramifications of untreated ISMAEL and the importance of treatment were discussed with the patient at length.\par F/u after CPAP Titration study.

## 2020-11-15 ENCOUNTER — RX RENEWAL (OUTPATIENT)
Age: 45
End: 2020-11-15

## 2020-11-16 ENCOUNTER — RX RENEWAL (OUTPATIENT)
Age: 45
End: 2020-11-16

## 2020-12-03 ENCOUNTER — APPOINTMENT (OUTPATIENT)
Dept: INTERNAL MEDICINE | Facility: CLINIC | Age: 45
End: 2020-12-03
Payer: COMMERCIAL

## 2020-12-03 VITALS
SYSTOLIC BLOOD PRESSURE: 128 MMHG | HEIGHT: 71 IN | OXYGEN SATURATION: 98 % | WEIGHT: 227 LBS | BODY MASS INDEX: 31.78 KG/M2 | HEART RATE: 76 BPM | TEMPERATURE: 97.6 F | DIASTOLIC BLOOD PRESSURE: 88 MMHG

## 2020-12-03 PROCEDURE — 99072 ADDL SUPL MATRL&STAF TM PHE: CPT

## 2020-12-03 PROCEDURE — 99214 OFFICE O/P EST MOD 30 MIN: CPT

## 2020-12-03 NOTE — HISTORY OF PRESENT ILLNESS
[FreeTextEntry1] : HTN [de-identified] : HTN-     He  is  compliant with med. \par   Patient is compliant with diet. Mr. PAREDES  exercises - bike]. \par He  has no CP or SOB.\par \par hypothyroid- compliant w meds. no palpit. temp intolerance\par ISMAEL- CPAP\par gassy- pt f/u w his GI.  reviewed diet w pt\par asthma- controlled.  better w exercise.  compliant w symbicort.  needs albuterol 1-2 times/wk

## 2020-12-18 ENCOUNTER — APPOINTMENT (OUTPATIENT)
Dept: DISASTER EMERGENCY | Facility: CLINIC | Age: 45
End: 2020-12-18

## 2020-12-20 LAB — SARS-COV-2 N GENE NPH QL NAA+PROBE: NOT DETECTED

## 2020-12-23 ENCOUNTER — APPOINTMENT (OUTPATIENT)
Dept: SLEEP CENTER | Facility: CLINIC | Age: 45
End: 2020-12-23
Payer: COMMERCIAL

## 2020-12-23 ENCOUNTER — OUTPATIENT (OUTPATIENT)
Dept: OUTPATIENT SERVICES | Facility: HOSPITAL | Age: 45
LOS: 1 days | End: 2020-12-23
Payer: COMMERCIAL

## 2020-12-23 PROCEDURE — 95811 POLYSOM 6/>YRS CPAP 4/> PARM: CPT | Mod: 26

## 2020-12-23 PROCEDURE — 95811 POLYSOM 6/>YRS CPAP 4/> PARM: CPT

## 2020-12-24 DIAGNOSIS — G47.33 OBSTRUCTIVE SLEEP APNEA (ADULT) (PEDIATRIC): ICD-10-CM

## 2020-12-30 ENCOUNTER — NON-APPOINTMENT (OUTPATIENT)
Age: 45
End: 2020-12-30

## 2021-02-22 ENCOUNTER — TRANSCRIPTION ENCOUNTER (OUTPATIENT)
Age: 46
End: 2021-02-22

## 2021-03-02 ENCOUNTER — TRANSCRIPTION ENCOUNTER (OUTPATIENT)
Age: 46
End: 2021-03-02

## 2021-04-02 ENCOUNTER — APPOINTMENT (OUTPATIENT)
Dept: INTERNAL MEDICINE | Facility: CLINIC | Age: 46
End: 2021-04-02
Payer: COMMERCIAL

## 2021-04-02 VITALS
WEIGHT: 227 LBS | OXYGEN SATURATION: 98 % | HEART RATE: 78 BPM | HEIGHT: 71 IN | TEMPERATURE: 97 F | BODY MASS INDEX: 31.78 KG/M2 | SYSTOLIC BLOOD PRESSURE: 110 MMHG | DIASTOLIC BLOOD PRESSURE: 78 MMHG

## 2021-04-02 PROCEDURE — 36415 COLL VENOUS BLD VENIPUNCTURE: CPT

## 2021-04-02 PROCEDURE — 99072 ADDL SUPL MATRL&STAF TM PHE: CPT

## 2021-04-02 PROCEDURE — 99214 OFFICE O/P EST MOD 30 MIN: CPT | Mod: 25

## 2021-04-02 NOTE — HISTORY OF PRESENT ILLNESS
[FreeTextEntry1] : HTN [de-identified] : HTN-     He  is  compliant with med. \par   Patient is compliant with diet. Mr. PAREDES  exercises - bike. \par He  has no CP or SOB.\par \par hypothyroid- compliant w meds. no palpit. temp intolerance\par ISMAEL- CPAP.  reviewed 12/30/20 note reviewed. \par 10/27/20 labs reviewed- elev trigly\par gassy- resolved\par asthma- controlled.  better w exercise.  compliant w symbicort. no needed albuterol for mo.

## 2021-04-06 LAB
ANION GAP SERPL CALC-SCNC: 11 MMOL/L
BUN SERPL-MCNC: 16 MG/DL
CALCIUM SERPL-MCNC: 10.1 MG/DL
CHLORIDE SERPL-SCNC: 102 MMOL/L
CHOLEST SERPL-MCNC: 173 MG/DL
CO2 SERPL-SCNC: 29 MMOL/L
CREAT SERPL-MCNC: 1.06 MG/DL
GLUCOSE SERPL-MCNC: 96 MG/DL
HDLC SERPL-MCNC: 68 MG/DL
LDLC SERPL CALC-MCNC: 58 MG/DL
LDLC SERPL DIRECT ASSAY-MCNC: 71 MG/DL
NONHDLC SERPL-MCNC: 104 MG/DL
POTASSIUM SERPL-SCNC: 4.3 MMOL/L
SAVE SPECIMEN: NORMAL
SODIUM SERPL-SCNC: 141 MMOL/L
T4 FREE SERPL-MCNC: 1.6 NG/DL
TRIGL SERPL-MCNC: 230 MG/DL
TSH SERPL-ACNC: 0.22 UIU/ML

## 2021-04-14 ENCOUNTER — APPOINTMENT (OUTPATIENT)
Dept: PULMONOLOGY | Facility: CLINIC | Age: 46
End: 2021-04-14
Payer: COMMERCIAL

## 2021-04-14 VITALS
BODY MASS INDEX: 31.78 KG/M2 | TEMPERATURE: 97.4 F | WEIGHT: 227 LBS | DIASTOLIC BLOOD PRESSURE: 85 MMHG | HEIGHT: 71 IN | SYSTOLIC BLOOD PRESSURE: 145 MMHG | HEART RATE: 84 BPM | OXYGEN SATURATION: 99 % | RESPIRATION RATE: 15 BRPM

## 2021-04-14 PROCEDURE — 99212 OFFICE O/P EST SF 10 MIN: CPT | Mod: GC

## 2021-04-14 PROCEDURE — 99072 ADDL SUPL MATRL&STAF TM PHE: CPT

## 2021-04-14 NOTE — HISTORY OF PRESENT ILLNESS
[FreeTextEntry1] : 45M hx ISMAEL, HTN, asthma, hypothyroidism, who comes for follow up. Last seen on 11/12/2020. At that time, pt was using OAT but with repeat PSG found to have increased sleep disordered breathing. CPAP titration was ordered and pt had good response on CPAP 12 cmH2O. Pt states that he is overall feeling better and more awake but still getting used to PAP therapy. Has had PAP over the last 3 months. \par \par Currently using DreamStation CPAP Pro at 12 cmH2O\par \par PAP compliance: Days > 4 hrs 96.7%, Avg use 5 hrs 46 min, therapeutic AHI 1.6/hr\par \par CPAP titration 12/23/2020: 12 cmH2O\par PSG with oral appliance 9/2020: AHI 21.3, T90 13.2%\par PSG 10/2019: AHI 8.7, worse in REM 21.7 and supine 17.1\par

## 2021-04-14 NOTE — PHYSICAL EXAM
[General Appearance - Well Developed] : well developed [General Appearance - Well Nourished] : well nourished [Neck Appearance] : the appearance of the neck was normal [] : no respiratory distress [Exaggerated Use Of Accessory Muscles For Inspiration] : no accessory muscle use [Bowel Sounds] : normal bowel sounds [Abdomen Soft] : soft [Abnormal Walk] : normal gait [Nail Clubbing] : no clubbing of the fingernails [Cyanosis, Localized] : no localized cyanosis [Skin Lesions] : no skin lesions [Motor Exam] : the motor exam was normal [No Focal Deficits] : no focal deficits [Oriented To Time, Place, And Person] : oriented to person, place, and time [Mood] : the mood was normal

## 2021-04-14 NOTE — ASSESSMENT
[FreeTextEntry1] : 45M hx ISMAEL, HTN, asthma, hypothyroidism, who comes for follow up. Has mild sleep apnea and has been using CPAP 12 cmH2O, which he find beneficial. He overall feels more awake and alert. He is exercising more. Compliant with PAP therapy as well with therapeutic AHI 1.6 and days > 4 hrs 96.7%. Pt continues to derive benefit from PAP therapy. Can f/u annually for PAP compliance.

## 2021-07-30 ENCOUNTER — NON-APPOINTMENT (OUTPATIENT)
Age: 46
End: 2021-07-30

## 2021-08-11 ENCOUNTER — NON-APPOINTMENT (OUTPATIENT)
Age: 46
End: 2021-08-11

## 2021-09-29 ENCOUNTER — TRANSCRIPTION ENCOUNTER (OUTPATIENT)
Age: 46
End: 2021-09-29

## 2021-09-30 ENCOUNTER — RX RENEWAL (OUTPATIENT)
Age: 46
End: 2021-09-30

## 2021-09-30 ENCOUNTER — TRANSCRIPTION ENCOUNTER (OUTPATIENT)
Age: 46
End: 2021-09-30

## 2021-10-25 ENCOUNTER — NON-APPOINTMENT (OUTPATIENT)
Age: 46
End: 2021-10-25

## 2021-10-29 ENCOUNTER — APPOINTMENT (OUTPATIENT)
Dept: INTERNAL MEDICINE | Facility: CLINIC | Age: 46
End: 2021-10-29

## 2021-11-04 ENCOUNTER — NON-APPOINTMENT (OUTPATIENT)
Age: 46
End: 2021-11-04

## 2021-11-05 ENCOUNTER — APPOINTMENT (OUTPATIENT)
Dept: INTERNAL MEDICINE | Facility: CLINIC | Age: 46
End: 2021-11-05
Payer: COMMERCIAL

## 2021-11-05 ENCOUNTER — NON-APPOINTMENT (OUTPATIENT)
Age: 46
End: 2021-11-05

## 2021-11-05 VITALS
HEIGHT: 71 IN | WEIGHT: 228 LBS | RESPIRATION RATE: 17 BRPM | OXYGEN SATURATION: 98 % | BODY MASS INDEX: 31.92 KG/M2 | SYSTOLIC BLOOD PRESSURE: 138 MMHG | HEART RATE: 85 BPM | DIASTOLIC BLOOD PRESSURE: 80 MMHG

## 2021-11-05 VITALS — SYSTOLIC BLOOD PRESSURE: 130 MMHG | DIASTOLIC BLOOD PRESSURE: 86 MMHG

## 2021-11-05 DIAGNOSIS — Z01.818 ENCOUNTER FOR OTHER PREPROCEDURAL EXAMINATION: ICD-10-CM

## 2021-11-05 PROCEDURE — 93000 ELECTROCARDIOGRAM COMPLETE: CPT

## 2021-11-05 PROCEDURE — 99396 PREV VISIT EST AGE 40-64: CPT | Mod: 25

## 2021-11-05 RX ORDER — CLINDAMYCIN PHOSPHATE AND BENZOYL PEROXIDE 10; 37.5 MG/G; MG/G
1.2-3.75 GEL TOPICAL
Qty: 50 | Refills: 0 | Status: DISCONTINUED | COMMUNITY
Start: 2020-10-15 | End: 2021-11-05

## 2021-11-05 RX ORDER — LEVOTHYROXINE SODIUM 0.15 MG/1
150 TABLET ORAL
Qty: 15 | Refills: 1 | Status: DISCONTINUED | COMMUNITY
Start: 2020-01-20 | End: 2021-11-05

## 2021-11-05 RX ORDER — KETOCONAZOLE 20.5 MG/ML
2 SHAMPOO, SUSPENSION TOPICAL
Qty: 120 | Refills: 0 | Status: DISCONTINUED | COMMUNITY
Start: 2020-10-15 | End: 2021-11-05

## 2021-11-05 NOTE — PLAN
[FreeTextEntry1] : ED- discussed viagra, cialis- SE were discussed- pt will think about it. \par hypothyroid- decr dose of thyroid.  rpt in 6 wk\par BP- low salt diet, ck home BP\par EKG- NSR 71

## 2021-11-05 NOTE — HISTORY OF PRESENT ILLNESS
[FreeTextEntry1] : CPE [de-identified] : vaccine- flu vac 10/21, COVID 4/16/21, 5/7/21\par diet- reviewed healthy diet\par exercise- bike 6/wk- 30 min\par no testicular pain\par HTN-     He  is  compliant with med. home BP  120's/80\par   Patient is compliant with diet. Mr. PAREDES  exercises - bike- 30 min 6 days a wk-\par He  has no CP or SOB.\par \par hypothyroid- compliant w meds. no palpit. temp intolerance\par ISMAEL- CPAP.  reviewed 4/14/21 Sleep Med note \par ED- for a few yrs. \par 7/19/18- EGD- mild gastritis - stomach bothers him every few months\par 10/29/21 labs reviewed- elev tsh\par asthma- controlled for a few yrs.  better w exercise.  compliant w symbicort. no needed albuterol for mo.Sees Pulm\par no anxiety

## 2021-11-05 NOTE — PHYSICAL EXAM
[No Acute Distress] : no acute distress [Well Nourished] : well nourished [Well Developed] : well developed [Well-Appearing] : well-appearing [Normal Sclera/Conjunctiva] : normal sclera/conjunctiva [PERRL] : pupils equal round and reactive to light [Normal Outer Ear/Nose] : the outer ears and nose were normal in appearance [Normal Oropharynx] : the oropharynx was normal [Normal TMs] : both tympanic membranes were normal [No Lymphadenopathy] : no lymphadenopathy [Supple] : supple [No Respiratory Distress] : no respiratory distress  [No Accessory Muscle Use] : no accessory muscle use [Clear to Auscultation] : lungs were clear to auscultation bilaterally [Normal Rate] : normal rate  [Regular Rhythm] : with a regular rhythm [Normal S1, S2] : normal S1 and S2 [No Murmur] : no murmur heard [No Edema] : there was no peripheral edema [Soft] : abdomen soft [Non Tender] : non-tender [Non-distended] : non-distended [No Masses] : no abdominal mass palpated [Normal Bowel Sounds] : normal bowel sounds [Normal Supraclavicular Nodes] : no supraclavicular lymphadenopathy [Normal Axillary Nodes] : no axillary lymphadenopathy [Normal Posterior Cervical Nodes] : no posterior cervical lymphadenopathy [Normal Anterior Cervical Nodes] : no anterior cervical lymphadenopathy [Normal Inguinal Nodes] : no inguinal lymphadenopathy [Grossly Normal Strength/Tone] : grossly normal strength/tone [No Focal Deficits] : no focal deficits [Normal Gait] : normal gait [Normal Affect] : the affect was normal [Normal Insight/Judgement] : insight and judgment were intact [No Mass] : no mass [Prostate Enlargement] : the prostate was not enlarged [Prostate Tenderness] : the prostate was not tender [de-identified] : L thyroid- enlg [FreeTextEntry1] : stool guaic neg

## 2021-11-05 NOTE — HEALTH RISK ASSESSMENT
[Yes] : Yes [1 or 2 (0 pts)] : 1 or 2 (0 points) [Never (0 pts)] : Never (0 points) [No] : In the past 12 months have you used drugs other than those required for medical reasons? No [0] : 2) Feeling down, depressed, or hopeless: Not at all (0) [Patient reported colonoscopy was normal] : Patient reported colonoscopy was normal [HIV test declined] : HIV test declined [Hepatitis C test declined] : Hepatitis C test declined [2 - 4 times a month (2 pts)] : 2-4 times a month (2 points) [PHQ-2 Negative - No further assessment needed] : PHQ-2 Negative - No further assessment needed [With Patient/Caregiver] : , with patient/caregiver [Designated Healthcare Proxy] : Designated healthcare proxy [Name: ___] : Health Care Proxy's Name: [unfilled]  [Relationship: ___] : Relationship: [unfilled] [] : No [Audit-CScore] : 2 [[id number]] : 0 [Reports changes in vision] : Reports no changes in vision [ColonoscopyDate] : 01/18 [ColonoscopyComments] : as per pt [de-identified] : wears glasses.  seen optometrist- 06/21 [de-identified] : seen dentist 07/21 [AdvancecareDate] : 11/21

## 2021-11-10 ENCOUNTER — TRANSCRIPTION ENCOUNTER (OUTPATIENT)
Age: 46
End: 2021-11-10

## 2021-12-23 ENCOUNTER — OUTPATIENT (OUTPATIENT)
Dept: OUTPATIENT SERVICES | Facility: HOSPITAL | Age: 46
LOS: 1 days | End: 2021-12-23
Payer: COMMERCIAL

## 2021-12-23 ENCOUNTER — APPOINTMENT (OUTPATIENT)
Dept: ULTRASOUND IMAGING | Facility: CLINIC | Age: 46
End: 2021-12-23
Payer: COMMERCIAL

## 2021-12-23 DIAGNOSIS — E04.9 NONTOXIC GOITER, UNSPECIFIED: ICD-10-CM

## 2021-12-23 DIAGNOSIS — Z00.8 ENCOUNTER FOR OTHER GENERAL EXAMINATION: ICD-10-CM

## 2021-12-23 PROCEDURE — 76536 US EXAM OF HEAD AND NECK: CPT

## 2021-12-23 PROCEDURE — 76536 US EXAM OF HEAD AND NECK: CPT | Mod: 26

## 2021-12-28 LAB
T4 FREE SERPL-MCNC: 1.3 NG/DL
TSH SERPL-ACNC: 0.61 UIU/ML

## 2021-12-31 ENCOUNTER — RX RENEWAL (OUTPATIENT)
Age: 46
End: 2021-12-31

## 2021-12-31 LAB
TESTOST BND SERPL-MCNC: 9.8 PG/ML
TESTOSTERONE TOTAL S: 348 NG/DL

## 2021-12-31 RX ORDER — LEVOTHYROXINE SODIUM 0.15 MG/1
150 TABLET ORAL
Qty: 30 | Refills: 3 | Status: DISCONTINUED | COMMUNITY
Start: 2020-05-10 | End: 2021-12-31

## 2022-01-05 ENCOUNTER — APPOINTMENT (OUTPATIENT)
Dept: INTERNAL MEDICINE | Facility: CLINIC | Age: 47
End: 2022-01-05
Payer: COMMERCIAL

## 2022-01-05 VITALS
HEIGHT: 71 IN | HEART RATE: 84 BPM | OXYGEN SATURATION: 99 % | BODY MASS INDEX: 31.5 KG/M2 | SYSTOLIC BLOOD PRESSURE: 138 MMHG | WEIGHT: 225 LBS | DIASTOLIC BLOOD PRESSURE: 100 MMHG | TEMPERATURE: 97.6 F

## 2022-01-05 VITALS — DIASTOLIC BLOOD PRESSURE: 90 MMHG | SYSTOLIC BLOOD PRESSURE: 124 MMHG

## 2022-01-05 PROCEDURE — 99214 OFFICE O/P EST MOD 30 MIN: CPT

## 2022-01-05 NOTE — PLAN
[FreeTextEntry1] : HTN- observe.  pt to ck BP at home and bring his machine to the next visit. \par ED- ck A1c, cMP, lipid prior to next at next visit

## 2022-01-05 NOTE — HISTORY OF PRESENT ILLNESS
[FreeTextEntry1] : HTN [de-identified] : \par exercise- bike 6/wk- 30 min\par HTN- compliant with med. home BP  120's/80\par   Patient is compliant with diet. Mr. PAREDES  exercises - bike- 30 min 6 days a wk-\par He  has no CP or SOB.\par thyroid- reviewed 12/27/21- nl labs.  , 12/23/21- US thyroid - atrophy\par \par hypothyroid- compliant w meds. no palpit. temp intolerance\par ISMAEL- CPAP.  reviewed 4/14/21 Sleep Med note \par ED- for a few yrs. \par 7/19/18- EGD- mild gastritis - stomach bothers him every few months\par 10/29/21 labs reviewed- elev tsh\par asthma- controlled for a few yrs.  better w exercise.  compliant w symbicort. no needed albuterol for mo.Sees Pulm\par no anxiety

## 2022-03-14 LAB
ALBUMIN SERPL ELPH-MCNC: 4.4 G/DL
ALP BLD-CCNC: 68 U/L
ALT SERPL-CCNC: 17 U/L
ANION GAP SERPL CALC-SCNC: 11 MMOL/L
AST SERPL-CCNC: 16 U/L
BILIRUB SERPL-MCNC: 0.3 MG/DL
BUN SERPL-MCNC: 20 MG/DL
CALCIUM SERPL-MCNC: 9.6 MG/DL
CHLORIDE SERPL-SCNC: 106 MMOL/L
CHOLEST SERPL-MCNC: 167 MG/DL
CO2 SERPL-SCNC: 25 MMOL/L
CREAT SERPL-MCNC: 1.13 MG/DL
EGFR: 82 ML/MIN/1.73M2
ESTIMATED AVERAGE GLUCOSE: 105 MG/DL
GLUCOSE SERPL-MCNC: 97 MG/DL
HBA1C MFR BLD HPLC: 5.3 %
HDLC SERPL-MCNC: 58 MG/DL
LDLC SERPL CALC-MCNC: 74 MG/DL
NONHDLC SERPL-MCNC: 109 MG/DL
POTASSIUM SERPL-SCNC: 4.4 MMOL/L
PROT SERPL-MCNC: 6.7 G/DL
SODIUM SERPL-SCNC: 142 MMOL/L
TRIGL SERPL-MCNC: 177 MG/DL
TSH SERPL-ACNC: 0.76 UIU/ML

## 2022-03-21 ENCOUNTER — APPOINTMENT (OUTPATIENT)
Dept: INTERNAL MEDICINE | Facility: CLINIC | Age: 47
End: 2022-03-21
Payer: COMMERCIAL

## 2022-03-21 VITALS — SYSTOLIC BLOOD PRESSURE: 120 MMHG | DIASTOLIC BLOOD PRESSURE: 90 MMHG

## 2022-03-21 VITALS
HEIGHT: 71 IN | BODY MASS INDEX: 31.92 KG/M2 | OXYGEN SATURATION: 98 % | TEMPERATURE: 97.6 F | WEIGHT: 228 LBS | DIASTOLIC BLOOD PRESSURE: 80 MMHG | SYSTOLIC BLOOD PRESSURE: 130 MMHG | HEART RATE: 92 BPM

## 2022-03-21 DIAGNOSIS — R22.9 LOCALIZED SWELLING, MASS AND LUMP, UNSPECIFIED: ICD-10-CM

## 2022-03-21 DIAGNOSIS — Z86.39 PERSONAL HISTORY OF OTHER ENDOCRINE, NUTRITIONAL AND METABOLIC DISEASE: ICD-10-CM

## 2022-03-21 PROCEDURE — 99214 OFFICE O/P EST MOD 30 MIN: CPT

## 2022-03-21 NOTE — HISTORY OF PRESENT ILLNESS
[FreeTextEntry1] : HTN [de-identified] : \par exercise- bike 6/wk- 30 min\par HTN- compliant with med. home BP  120's/80.  today in the office 138/102\par   Patient is compliant with diet. Mr. PAREDES  exercises - bike- 30 min 6 days a wk-\par He  has no CP or SOB.\par thyroid- reviewed 3/12/22- nl labs.  , 12/23/21- US thyroid - atrophy.  compliant w meds. no palpit. temp intolerance\par ISMAEL- CPAP.  reviewed 4/14/21 Sleep Med note  \par 7/19/18- EGD- mild gastritis - stomach bothers him every few months\par asthma- controlled for a few yrs.  better w exercise.  compliant w symbicort. no needed albuterol for mo.Sees Pulm\par elev trigly- improved\par  1yr - growth on forehead- no chg in size

## 2022-03-21 NOTE — PLAN
[FreeTextEntry1] : asthma- Stable. Continue establish treatment plan.\par HTN- white coat HTN- cont monitoring BP at home

## 2022-07-06 ENCOUNTER — TRANSCRIPTION ENCOUNTER (OUTPATIENT)
Age: 47
End: 2022-07-06

## 2022-07-16 LAB
ALBUMIN SERPL ELPH-MCNC: 4.5 G/DL
ALP BLD-CCNC: 79 U/L
ALT SERPL-CCNC: 21 U/L
ANION GAP SERPL CALC-SCNC: 10 MMOL/L
AST SERPL-CCNC: 21 U/L
BILIRUB SERPL-MCNC: 0.6 MG/DL
BUN SERPL-MCNC: 17 MG/DL
CALCIUM SERPL-MCNC: 9.9 MG/DL
CHLORIDE SERPL-SCNC: 105 MMOL/L
CO2 SERPL-SCNC: 27 MMOL/L
CREAT SERPL-MCNC: 1.08 MG/DL
EGFR: 86 ML/MIN/1.73M2
GLUCOSE SERPL-MCNC: 93 MG/DL
POTASSIUM SERPL-SCNC: 4.3 MMOL/L
PROT SERPL-MCNC: 7 G/DL
SODIUM SERPL-SCNC: 143 MMOL/L
T4 FREE SERPL-MCNC: 1.7 NG/DL
TSH SERPL-ACNC: 0.37 UIU/ML

## 2022-07-22 ENCOUNTER — APPOINTMENT (OUTPATIENT)
Dept: INTERNAL MEDICINE | Facility: CLINIC | Age: 47
End: 2022-07-22

## 2022-07-22 ENCOUNTER — NON-APPOINTMENT (OUTPATIENT)
Age: 47
End: 2022-07-22

## 2022-07-22 VITALS — DIASTOLIC BLOOD PRESSURE: 80 MMHG | SYSTOLIC BLOOD PRESSURE: 130 MMHG | OXYGEN SATURATION: 98 % | HEART RATE: 85 BPM

## 2022-07-22 VITALS — DIASTOLIC BLOOD PRESSURE: 94 MMHG | SYSTOLIC BLOOD PRESSURE: 130 MMHG

## 2022-07-22 VITALS
HEART RATE: 74 BPM | DIASTOLIC BLOOD PRESSURE: 90 MMHG | HEIGHT: 71 IN | WEIGHT: 232 LBS | BODY MASS INDEX: 32.48 KG/M2 | OXYGEN SATURATION: 98 % | TEMPERATURE: 98.1 F | SYSTOLIC BLOOD PRESSURE: 138 MMHG

## 2022-07-22 VITALS — DIASTOLIC BLOOD PRESSURE: 100 MMHG | SYSTOLIC BLOOD PRESSURE: 140 MMHG | HEART RATE: 75 BPM

## 2022-07-22 DIAGNOSIS — F41.9 ANXIETY DISORDER, UNSPECIFIED: ICD-10-CM

## 2022-07-22 PROCEDURE — 99214 OFFICE O/P EST MOD 30 MIN: CPT | Mod: 25

## 2022-07-22 PROCEDURE — 93000 ELECTROCARDIOGRAM COMPLETE: CPT

## 2022-07-22 NOTE — HISTORY OF PRESENT ILLNESS
[FreeTextEntry1] : HTN [de-identified] : reviewed 7/15/22 labs\par exercise- bike 6/wk- 30 min\par HTN- compliant with med. home BP  120's/80.  last visit, pt BP machine matched office readings\par   Patient is compliant with diet. Mr. PAREDES  exercises - bike- 30 min 6 days a wk-\par He  has no CP or SOB.\par  in AM- c/o " head rush", tightness in back of the neck, head when bending forward, also has sensation of jitteriness- intermittently thru out the day-worse w activity. no CP or palpitations.  feels anxious after these episodes.   doesn't drink any caffeine. sensations are not in the chest but in the head.  had sympt many yrs ago- was tx w antianxiety meds which then resolved.  now sympt started a few mo ago.  no new stressors\par no nasal congestion, sinus pain. \par duration sev hrs. \par LUIS score 6 - mild anxiety\par thyroid- reviewed 3/12/22- nl labs.  , 12/23/21- US thyroid - atrophy.  compliant w meds. no palpit. temp intolerance\par ISMAEL- CPAP.  reviewed 4/14/21 Sleep Med note  \par 7/19/18- EGD- mild gastritis - stomach bothers him every few months\par asthma- controlled for a few yrs.  better w exercise.  compliant w symbicort. no needed albuterol for mo.Sees Pulm\par elev trigly- improved\par

## 2022-07-28 ENCOUNTER — APPOINTMENT (OUTPATIENT)
Dept: CARDIOLOGY | Facility: CLINIC | Age: 47
End: 2022-07-28

## 2022-07-28 PROCEDURE — ZZZZZ: CPT

## 2022-08-12 ENCOUNTER — TRANSCRIPTION ENCOUNTER (OUTPATIENT)
Age: 47
End: 2022-08-12

## 2022-09-26 ENCOUNTER — TRANSCRIPTION ENCOUNTER (OUTPATIENT)
Age: 47
End: 2022-09-26

## 2022-10-28 ENCOUNTER — APPOINTMENT (OUTPATIENT)
Dept: PULMONOLOGY | Facility: CLINIC | Age: 47
End: 2022-10-28

## 2022-10-28 VITALS
HEIGHT: 71 IN | TEMPERATURE: 97.2 F | DIASTOLIC BLOOD PRESSURE: 89 MMHG | WEIGHT: 233 LBS | BODY MASS INDEX: 32.62 KG/M2 | RESPIRATION RATE: 16 BRPM | SYSTOLIC BLOOD PRESSURE: 130 MMHG | HEART RATE: 88 BPM

## 2022-10-28 PROCEDURE — 99212 OFFICE O/P EST SF 10 MIN: CPT

## 2022-10-28 RX ORDER — HYDROCORTISONE 1 %
12 CREAM (GRAM) TOPICAL
Qty: 400 | Refills: 0 | Status: ACTIVE | COMMUNITY
Start: 2022-07-06

## 2022-10-28 NOTE — HISTORY OF PRESENT ILLNESS
[Obstructive Sleep Apnea] : obstructive sleep apnea [AHI: ___ per hour] : Apnea-hypopnea index:  [unfilled] per hour [T90%: ___] : T90%: [unfilled]% [Ismael desatuation%: ___] : Ismael desaturation:  [unfilled]% [Date: ___] : the most recent therapeutic polysomnogram was completed [unfilled] [Unintentional Sleep While Inactive] : unintentional sleep while inactive [To Bed: ___] : ~he/she~ goes to bed at [unfilled] [Arises: ___] : arises at [unfilled] [Sleep Onset Latency: ___ minutes] : sleep onset latency of [unfilled] minutes reported [Nocturnal Awakenings: ___] : ~he/she~ typically has [unfilled] nocturnal awakenings [TST: ___] : Total sleep time is [unfilled] [Daytime Sleep: ___] : daytime sleep: [unfilled] [CPAP: ___ cmH2O] : CPAP: [unfilled] cmH2O [% Days used: ____] : Days used: [unfilled] % [% Days used > 4 hrs: ____] : Days used > 4 hrs: [unfilled] % [Mean nightly usage: ___ hrs] : Mean nightly usage: [unfilled] hrs [Therapy based AHI: ___ /hr] : Therapy based AHI: [unfilled] / hr [Snoring] : no snoring [Witnessed Apneas] : no witnessed sleep apnea [Frequent Nocturnal Awakening] : no nocturnal awakening [Unintentional Sleep while Active] : no unintentional sleep while active [Awakes Unrefreshed] : does not awake unrefreshed [Awakes with Headache] : no headache upon awakening [Awakening With Dry Mouth] : no dry mouth upon awakening [Recent  Weight Gain] : no recent weight gain [Daytime Somnolence] : no daytime somnolence [DIS] : no DIS [DMS] : no DMS [Unusual Sleep Behavior] : no unusual sleep behavior [Lower Extremity Discomfort] : no lower extremity discomfort in evening or at bedtime [Nocturnal Oxygen] : The patient does not use nocturnal oxygen [ESS] : 9 [FreeTextEntry1] : [id number] 30-day Data through 10.27.2022:  Pressure 12 cmH2O.  Leak average 0 secs. \par

## 2022-10-28 NOTE — ASSESSMENT
[FreeTextEntry1] : 47 year old J[place] PAREDES continues to derive benefit from CPAP therapy at 12 cmH2O since 2021 for MILD ISMAEL (AHI 8.7), moderate in REM (20.8) and supine position (17.1), using a full-face mask.\par \par History of:  HTN, Asthma, hypothyroidism, GERD, and obesity class I.\par \par The patient is benefitting from CPAP therapy with improvement in sleepiness; resolution to prior snoring, nonrestorative sleep, and apneas; and he should continue CPAP therapy at current settings.  Ulmer Sleepiness Scale score is 9, reduced from baseline of 14.  \par \par Reviewed DreamStation 2 AutoCPAP compliance and therapy data with patient:  Excellent CPAP compliance (100% days, 100% >4 hours, average 6 hrs 20 mins.), excellent therapeutic response (AHI 1.0), and insignificant mask leak.        \par \par [facility] renewal of CPAP supplies for 1-year. \par Follow-up annually or sooner if needed.\par \par \par \par \par \par \par

## 2022-10-28 NOTE — REVIEW OF SYSTEMS
[Obesity] : obesity [Negative] : Psychiatric [FreeTextEntry8] : asthma well-controlled. [FreeTextEntry7] : on pepcid for reflux with cough

## 2022-11-17 ENCOUNTER — TRANSCRIPTION ENCOUNTER (OUTPATIENT)
Age: 47
End: 2022-11-17

## 2022-12-02 ENCOUNTER — APPOINTMENT (OUTPATIENT)
Dept: INTERNAL MEDICINE | Facility: CLINIC | Age: 47
End: 2022-12-02

## 2022-12-02 VITALS
DIASTOLIC BLOOD PRESSURE: 90 MMHG | WEIGHT: 236 LBS | SYSTOLIC BLOOD PRESSURE: 132 MMHG | HEART RATE: 84 BPM | BODY MASS INDEX: 32.92 KG/M2 | OXYGEN SATURATION: 98 %

## 2022-12-02 VITALS — DIASTOLIC BLOOD PRESSURE: 90 MMHG | SYSTOLIC BLOOD PRESSURE: 130 MMHG

## 2022-12-02 PROCEDURE — 99214 OFFICE O/P EST MOD 30 MIN: CPT | Mod: 25

## 2022-12-02 PROCEDURE — 36415 COLL VENOUS BLD VENIPUNCTURE: CPT

## 2022-12-02 NOTE — HISTORY OF PRESENT ILLNESS
[FreeTextEntry1] : HTN [de-identified] : reviewed 7/15/22 labs.  got flu vac 10/2022, COVID vac 11/22\par exercise- stopped bike 6/wk- 30 min\par HTN- compliant with med. home BP  120's/80.  last visit, pt BP machine matched office readings.  reviewed 7/15/22 labs\par   Patient is compliant with diet. reviewed 7/28/22 24 BP machine\par He  has no CP or SOB.\par  in AM- c/o " head rush", tightness in back of the neck, head when bending forward, also has sensation of jitteriness- - sympts resolved after he chg his glasses\par anxiety-resolved\par thyroid- reviewed 3/12/22- nl labs.  , 12/23/21- US thyroid - atrophy.  compliant w meds. no palpit. temp intolerance\par ISMAEL- CPAP.  reviewed 10/28/22  Sleep Med note  \par 7/19/18- EGD- mild gastritis - stomach bothers him every few months\par asthma- controlled for a few yrs.  better w exercise.  compliant w symbicort. no needed albuterol for mo.Sees Pulm\par elev trigly- improved\par

## 2022-12-05 LAB
25(OH)D3 SERPL-MCNC: 44 NG/ML
ALBUMIN SERPL ELPH-MCNC: 4.8 G/DL
ALP BLD-CCNC: 67 U/L
ALT SERPL-CCNC: 39 U/L
ANION GAP SERPL CALC-SCNC: 14 MMOL/L
AST SERPL-CCNC: 27 U/L
BASOPHILS # BLD AUTO: 0.03 K/UL
BASOPHILS NFR BLD AUTO: 0.5 %
BILIRUB SERPL-MCNC: 0.4 MG/DL
BUN SERPL-MCNC: 21 MG/DL
CALCIUM SERPL-MCNC: 10 MG/DL
CHLORIDE SERPL-SCNC: 102 MMOL/L
CHOLEST SERPL-MCNC: 203 MG/DL
CO2 SERPL-SCNC: 24 MMOL/L
CREAT SERPL-MCNC: 1.12 MG/DL
EGFR: 82 ML/MIN/1.73M2
EOSINOPHIL # BLD AUTO: 0.08 K/UL
EOSINOPHIL NFR BLD AUTO: 1.3 %
GLUCOSE SERPL-MCNC: 94 MG/DL
HCT VFR BLD CALC: 48.9 %
HDLC SERPL-MCNC: 65 MG/DL
HGB BLD-MCNC: 16 G/DL
IMM GRANULOCYTES NFR BLD AUTO: 0.2 %
LDLC SERPL CALC-MCNC: 106 MG/DL
LYMPHOCYTES # BLD AUTO: 1.63 K/UL
LYMPHOCYTES NFR BLD AUTO: 25.8 %
MAN DIFF?: NORMAL
MCHC RBC-ENTMCNC: 29.9 PG
MCHC RBC-ENTMCNC: 32.7 GM/DL
MCV RBC AUTO: 91.2 FL
MONOCYTES # BLD AUTO: 0.52 K/UL
MONOCYTES NFR BLD AUTO: 8.2 %
NEUTROPHILS # BLD AUTO: 4.04 K/UL
NEUTROPHILS NFR BLD AUTO: 64 %
NONHDLC SERPL-MCNC: 138 MG/DL
PLATELET # BLD AUTO: 262 K/UL
POTASSIUM SERPL-SCNC: 4.3 MMOL/L
PROT SERPL-MCNC: 7.6 G/DL
PSA SERPL-MCNC: 0.87 NG/ML
RBC # BLD: 5.36 M/UL
RBC # FLD: 13 %
SODIUM SERPL-SCNC: 140 MMOL/L
T4 FREE SERPL-MCNC: 1.5 NG/DL
TRIGL SERPL-MCNC: 160 MG/DL
TSH SERPL-ACNC: 2.37 UIU/ML
WBC # FLD AUTO: 6.31 K/UL

## 2023-01-19 ENCOUNTER — NON-APPOINTMENT (OUTPATIENT)
Age: 48
End: 2023-01-19

## 2023-01-19 ENCOUNTER — APPOINTMENT (OUTPATIENT)
Dept: INTERNAL MEDICINE | Facility: CLINIC | Age: 48
End: 2023-01-19
Payer: COMMERCIAL

## 2023-01-19 VITALS
HEIGHT: 71 IN | HEART RATE: 88 BPM | BODY MASS INDEX: 32.9 KG/M2 | DIASTOLIC BLOOD PRESSURE: 82 MMHG | WEIGHT: 235 LBS | SYSTOLIC BLOOD PRESSURE: 140 MMHG | OXYGEN SATURATION: 98 %

## 2023-01-19 DIAGNOSIS — R09.81 NASAL CONGESTION: ICD-10-CM

## 2023-01-19 DIAGNOSIS — R45.0 NERVOUSNESS: ICD-10-CM

## 2023-01-19 DIAGNOSIS — E78.1 PURE HYPERGLYCERIDEMIA: ICD-10-CM

## 2023-01-19 DIAGNOSIS — R61 GENERALIZED HYPERHIDROSIS: ICD-10-CM

## 2023-01-19 DIAGNOSIS — Z12.11 ENCOUNTER FOR SCREENING FOR MALIGNANT NEOPLASM OF COLON: ICD-10-CM

## 2023-01-19 DIAGNOSIS — F52.9 UNSPECIFIED SEXUAL DYSFUNCTION NOT DUE TO A SUBSTANCE OR KNOWN PHYSIOLOGICAL CONDITION: ICD-10-CM

## 2023-01-19 DIAGNOSIS — N52.9 MALE ERECTILE DYSFUNCTION, UNSPECIFIED: ICD-10-CM

## 2023-01-19 DIAGNOSIS — Z99.89 DEPENDENCE ON OTHER ENABLING MACHINES AND DEVICES: ICD-10-CM

## 2023-01-19 PROCEDURE — 99396 PREV VISIT EST AGE 40-64: CPT | Mod: 25

## 2023-01-19 PROCEDURE — 93000 ELECTROCARDIOGRAM COMPLETE: CPT

## 2023-01-19 RX ORDER — TRIAMCINOLONE ACETONIDE 1 MG/G
0.1 CREAM TOPICAL
Qty: 30 | Refills: 0 | Status: DISCONTINUED | COMMUNITY
Start: 2022-06-08 | End: 2023-01-19

## 2023-01-19 RX ORDER — ALUMINUM CHLORIDE 20 %
20 SOLUTION, NON-ORAL TOPICAL DAILY
Qty: 1 | Refills: 5 | Status: ACTIVE | COMMUNITY
Start: 2023-01-19 | End: 1900-01-01

## 2023-01-19 RX ORDER — BENZONATATE 100 MG/1
100 CAPSULE ORAL
Qty: 60 | Refills: 0 | Status: DISCONTINUED | COMMUNITY
Start: 2022-10-26 | End: 2023-01-19

## 2023-01-19 NOTE — PLAN
[FreeTextEntry1] : pt will release colonoscopy report\par cough- add flonase- if not better f/u\par EKG- NSR 82

## 2023-01-19 NOTE — HISTORY OF PRESENT ILLNESS
[FreeTextEntry1] : CPE [de-identified] : reviewed 12/2/22  labs.  got flu vac 10/2022, COVID vac 11/22\par exercise- stopped \par pt had COVID- 1/2/23- sympt resolved after 3 days- but then 2-3 days later dev cough\par nasal congestion, rhinitis- 1 wk ago- better over the past weekend.  but still has cough- dry cough.  no sore throat.  use azelastine\par HTN- compliant with med. home BP  120's/80.  last visit, pt BP machine matched office readings.  reviewed 7/15/22 labs\par   Patient is compliant with diet. reviewed 7/28/22 24 BP machine\par He  has no CP or SOB.\par thyroid- reviewed 12/22- nl labs.  , 12/23/21- US thyroid - atrophy.  compliant w meds. no palpit. temp intolerance\par ISMAEL- CPAP.  reviewed 10/28/22  Sleep Med note  \par Being tx for GERD by ENT- has f/u 7/19/18- EGD- mild gastritis - stomach bothers him every few months- \par asthma- controlled for a few yrs.  better w exercise.  compliant w symbicort. no needed albuterol for mo.Sees Pulm\par ED- states he is able to masturbate. no testicular pain\par seen Derm recently\par excessive sweating- thru out his life.  clothes stain. \par \par

## 2023-01-19 NOTE — PHYSICAL EXAM
[No Acute Distress] : no acute distress [Well Nourished] : well nourished [Well Developed] : well developed [Well-Appearing] : well-appearing [Normal Sclera/Conjunctiva] : normal sclera/conjunctiva [PERRL] : pupils equal round and reactive to light [Normal Outer Ear/Nose] : the outer ears and nose were normal in appearance [Normal Oropharynx] : the oropharynx was normal [Normal TMs] : both tympanic membranes were normal [No Lymphadenopathy] : no lymphadenopathy [Supple] : supple [No Respiratory Distress] : no respiratory distress  [No Accessory Muscle Use] : no accessory muscle use [Clear to Auscultation] : lungs were clear to auscultation bilaterally [Normal Rate] : normal rate  [Regular Rhythm] : with a regular rhythm [Normal S1, S2] : normal S1 and S2 [No Murmur] : no murmur heard [No Edema] : there was no peripheral edema [Soft] : abdomen soft [Non Tender] : non-tender [Non-distended] : non-distended [No Masses] : no abdominal mass palpated [Normal Bowel Sounds] : normal bowel sounds [No Mass] : no mass [Prostate Enlargement] : the prostate was not enlarged [Prostate Tenderness] : the prostate was not tender [Normal Supraclavicular Nodes] : no supraclavicular lymphadenopathy [Normal Axillary Nodes] : no axillary lymphadenopathy [Normal Posterior Cervical Nodes] : no posterior cervical lymphadenopathy [Normal Anterior Cervical Nodes] : no anterior cervical lymphadenopathy [Normal Inguinal Nodes] : no inguinal lymphadenopathy [Grossly Normal Strength/Tone] : grossly normal strength/tone [No Focal Deficits] : no focal deficits [Normal Gait] : normal gait [Normal Affect] : the affect was normal [Normal Insight/Judgement] : insight and judgment were intact [de-identified] : L thyroid- enlg- had US prev [FreeTextEntry1] : stool guaic neg

## 2023-01-19 NOTE — HEALTH RISK ASSESSMENT
[Yes] : Yes [2 - 4 times a month (2 pts)] : 2-4 times a month (2 points) [1 or 2 (0 pts)] : 1 or 2 (0 points) [Never (0 pts)] : Never (0 points) [No] : In the past 12 months have you used drugs other than those required for medical reasons? No [0] : 2) Feeling down, depressed, or hopeless: Not at all (0) [PHQ-2 Negative - No further assessment needed] : PHQ-2 Negative - No further assessment needed [Patient reported colonoscopy was normal] : Patient reported colonoscopy was normal [HIV test declined] : HIV test declined [Hepatitis C test declined] : Hepatitis C test declined [With Patient/Caregiver] : , with patient/caregiver [Designated Healthcare Proxy] : Designated healthcare proxy [Name: ___] : Health Care Proxy's Name: [unfilled]  [Relationship: ___] : Relationship: [unfilled] [Never] : Never [Audit-CScore] : 2 [[id number]] : 0 [Reports changes in vision] : Reports no changes in vision [ColonoscopyDate] : 01/18 [ColonoscopyComments] : as per pt [de-identified] : wears glasses.  seen optometrist- 06/22 [de-identified] : seen dentist 08/22 [AdvancecareDate] : 01/23

## 2023-05-05 ENCOUNTER — RX RENEWAL (OUTPATIENT)
Age: 48
End: 2023-05-05

## 2023-05-19 ENCOUNTER — APPOINTMENT (OUTPATIENT)
Dept: INTERNAL MEDICINE | Facility: CLINIC | Age: 48
End: 2023-05-19
Payer: COMMERCIAL

## 2023-05-19 VITALS
DIASTOLIC BLOOD PRESSURE: 90 MMHG | BODY MASS INDEX: 33.61 KG/M2 | WEIGHT: 241 LBS | SYSTOLIC BLOOD PRESSURE: 132 MMHG | HEART RATE: 84 BPM | OXYGEN SATURATION: 98 %

## 2023-05-19 VITALS — SYSTOLIC BLOOD PRESSURE: 120 MMHG | DIASTOLIC BLOOD PRESSURE: 84 MMHG

## 2023-05-19 PROCEDURE — 99214 OFFICE O/P EST MOD 30 MIN: CPT | Mod: 25

## 2023-05-19 PROCEDURE — 36415 COLL VENOUS BLD VENIPUNCTURE: CPT

## 2023-05-19 NOTE — PLAN
[FreeTextEntry1] : fasting labs- Blood was collected in the office.\par cough- will incr symbicort for 7-10 days- f/u if not better\par f/u for CPE

## 2023-05-22 LAB
ANION GAP SERPL CALC-SCNC: 13 MMOL/L
BUN SERPL-MCNC: 17 MG/DL
CALCIUM SERPL-MCNC: 9.8 MG/DL
CHLORIDE SERPL-SCNC: 105 MMOL/L
CO2 SERPL-SCNC: 24 MMOL/L
CREAT SERPL-MCNC: 1.04 MG/DL
EGFR: 90 ML/MIN/1.73M2
GLUCOSE SERPL-MCNC: 98 MG/DL
POTASSIUM SERPL-SCNC: 4.2 MMOL/L
SODIUM SERPL-SCNC: 142 MMOL/L
T4 FREE SERPL-MCNC: 1.4 NG/DL
TSH SERPL-ACNC: 1.5 UIU/ML

## 2023-06-24 ENCOUNTER — RX RENEWAL (OUTPATIENT)
Age: 48
End: 2023-06-24

## 2023-09-06 ENCOUNTER — EMERGENCY (EMERGENCY)
Facility: HOSPITAL | Age: 48
LOS: 1 days | Discharge: HOME | End: 2023-09-06
Attending: EMERGENCY MEDICINE | Admitting: EMERGENCY MEDICINE
Payer: COMMERCIAL

## 2023-09-06 VITALS
SYSTOLIC BLOOD PRESSURE: 124 MMHG | HEART RATE: 85 BPM | OXYGEN SATURATION: 94 % | TEMPERATURE: 98 F | DIASTOLIC BLOOD PRESSURE: 79 MMHG | RESPIRATION RATE: 17 BRPM

## 2023-09-06 VITALS
OXYGEN SATURATION: 93 % | WEIGHT: 229.94 LBS | HEIGHT: 71 IN | SYSTOLIC BLOOD PRESSURE: 143 MMHG | HEART RATE: 85 BPM | RESPIRATION RATE: 17 BRPM | DIASTOLIC BLOOD PRESSURE: 99 MMHG | TEMPERATURE: 99 F

## 2023-09-06 LAB
ALBUMIN SERPL ELPH-MCNC: 4 G/DL — SIGNIFICANT CHANGE UP (ref 3.3–5)
ALP SERPL-CCNC: 77 U/L — SIGNIFICANT CHANGE UP (ref 40–120)
ALT FLD-CCNC: 29 U/L — SIGNIFICANT CHANGE UP (ref 12–78)
ANION GAP SERPL CALC-SCNC: 5 MMOL/L — SIGNIFICANT CHANGE UP (ref 5–17)
APPEARANCE UR: CLEAR — SIGNIFICANT CHANGE UP
AST SERPL-CCNC: 21 U/L — SIGNIFICANT CHANGE UP (ref 15–37)
BASOPHILS # BLD AUTO: 0.02 K/UL — SIGNIFICANT CHANGE UP (ref 0–0.2)
BASOPHILS NFR BLD AUTO: 0.1 % — SIGNIFICANT CHANGE UP (ref 0–2)
BILIRUB SERPL-MCNC: 0.5 MG/DL — SIGNIFICANT CHANGE UP (ref 0.2–1.2)
BILIRUB UR-MCNC: NEGATIVE — SIGNIFICANT CHANGE UP
BUN SERPL-MCNC: 20 MG/DL — SIGNIFICANT CHANGE UP (ref 7–23)
CALCIUM SERPL-MCNC: 9.5 MG/DL — SIGNIFICANT CHANGE UP (ref 8.5–10.1)
CHLORIDE SERPL-SCNC: 105 MMOL/L — SIGNIFICANT CHANGE UP (ref 96–108)
CO2 SERPL-SCNC: 29 MMOL/L — SIGNIFICANT CHANGE UP (ref 22–31)
COLOR SPEC: YELLOW — SIGNIFICANT CHANGE UP
CREAT SERPL-MCNC: 1.6 MG/DL — HIGH (ref 0.5–1.3)
DIFF PNL FLD: ABNORMAL
EGFR: 53 ML/MIN/1.73M2 — LOW
EOSINOPHIL # BLD AUTO: 0.02 K/UL — SIGNIFICANT CHANGE UP (ref 0–0.5)
EOSINOPHIL NFR BLD AUTO: 0.1 % — SIGNIFICANT CHANGE UP (ref 0–6)
GLUCOSE SERPL-MCNC: 115 MG/DL — HIGH (ref 70–99)
GLUCOSE UR QL: NEGATIVE MG/DL — SIGNIFICANT CHANGE UP
HCT VFR BLD CALC: 47.6 % — SIGNIFICANT CHANGE UP (ref 39–50)
HGB BLD-MCNC: 15.6 G/DL — SIGNIFICANT CHANGE UP (ref 13–17)
IMM GRANULOCYTES NFR BLD AUTO: 0.4 % — SIGNIFICANT CHANGE UP (ref 0–0.9)
KETONES UR-MCNC: ABNORMAL MG/DL
LEUKOCYTE ESTERASE UR-ACNC: NEGATIVE — SIGNIFICANT CHANGE UP
LIDOCAIN IGE QN: 31 U/L — SIGNIFICANT CHANGE UP (ref 13–75)
LYMPHOCYTES # BLD AUTO: 1.09 K/UL — SIGNIFICANT CHANGE UP (ref 1–3.3)
LYMPHOCYTES # BLD AUTO: 8.2 % — LOW (ref 13–44)
MCHC RBC-ENTMCNC: 30.1 PG — SIGNIFICANT CHANGE UP (ref 27–34)
MCHC RBC-ENTMCNC: 32.8 GM/DL — SIGNIFICANT CHANGE UP (ref 32–36)
MCV RBC AUTO: 91.9 FL — SIGNIFICANT CHANGE UP (ref 80–100)
MONOCYTES # BLD AUTO: 0.64 K/UL — SIGNIFICANT CHANGE UP (ref 0–0.9)
MONOCYTES NFR BLD AUTO: 4.8 % — SIGNIFICANT CHANGE UP (ref 2–14)
NEUTROPHILS # BLD AUTO: 11.54 K/UL — HIGH (ref 1.8–7.4)
NEUTROPHILS NFR BLD AUTO: 86.4 % — HIGH (ref 43–77)
NITRITE UR-MCNC: NEGATIVE — SIGNIFICANT CHANGE UP
NRBC # BLD: 0 /100 WBCS — SIGNIFICANT CHANGE UP (ref 0–0)
PH UR: 5.5 — SIGNIFICANT CHANGE UP (ref 5–8)
PLATELET # BLD AUTO: 239 K/UL — SIGNIFICANT CHANGE UP (ref 150–400)
POTASSIUM SERPL-MCNC: 4.2 MMOL/L — SIGNIFICANT CHANGE UP (ref 3.5–5.3)
POTASSIUM SERPL-SCNC: 4.2 MMOL/L — SIGNIFICANT CHANGE UP (ref 3.5–5.3)
PROT SERPL-MCNC: 7.9 G/DL — SIGNIFICANT CHANGE UP (ref 6–8.3)
PROT UR-MCNC: NEGATIVE MG/DL — SIGNIFICANT CHANGE UP
RBC # BLD: 5.18 M/UL — SIGNIFICANT CHANGE UP (ref 4.2–5.8)
RBC # FLD: 12.6 % — SIGNIFICANT CHANGE UP (ref 10.3–14.5)
SODIUM SERPL-SCNC: 139 MMOL/L — SIGNIFICANT CHANGE UP (ref 135–145)
SP GR SPEC: 1.02 — SIGNIFICANT CHANGE UP (ref 1–1.03)
UROBILINOGEN FLD QL: 0.2 MG/DL — SIGNIFICANT CHANGE UP (ref 0.2–1)
WBC # BLD: 13.37 K/UL — HIGH (ref 3.8–10.5)
WBC # FLD AUTO: 13.37 K/UL — HIGH (ref 3.8–10.5)

## 2023-09-06 PROCEDURE — 96375 TX/PRO/DX INJ NEW DRUG ADDON: CPT

## 2023-09-06 PROCEDURE — 85025 COMPLETE CBC W/AUTO DIFF WBC: CPT

## 2023-09-06 PROCEDURE — 99285 EMERGENCY DEPT VISIT HI MDM: CPT

## 2023-09-06 PROCEDURE — 99284 EMERGENCY DEPT VISIT MOD MDM: CPT | Mod: 25

## 2023-09-06 PROCEDURE — 96374 THER/PROPH/DIAG INJ IV PUSH: CPT

## 2023-09-06 PROCEDURE — 83690 ASSAY OF LIPASE: CPT

## 2023-09-06 PROCEDURE — 74176 CT ABD & PELVIS W/O CONTRAST: CPT | Mod: 26,MA

## 2023-09-06 PROCEDURE — 36415 COLL VENOUS BLD VENIPUNCTURE: CPT

## 2023-09-06 PROCEDURE — 80053 COMPREHEN METABOLIC PANEL: CPT

## 2023-09-06 PROCEDURE — 74176 CT ABD & PELVIS W/O CONTRAST: CPT | Mod: MA

## 2023-09-06 PROCEDURE — 87086 URINE CULTURE/COLONY COUNT: CPT

## 2023-09-06 PROCEDURE — 81001 URINALYSIS AUTO W/SCOPE: CPT

## 2023-09-06 RX ORDER — ONDANSETRON 8 MG/1
1 TABLET, FILM COATED ORAL
Qty: 15 | Refills: 0
Start: 2023-09-06 | End: 2023-09-10

## 2023-09-06 RX ORDER — ACETAMINOPHEN 500 MG
1000 TABLET ORAL ONCE
Refills: 0 | Status: COMPLETED | OUTPATIENT
Start: 2023-09-06 | End: 2023-09-06

## 2023-09-06 RX ORDER — OXYCODONE AND ACETAMINOPHEN 5; 325 MG/1; MG/1
1 TABLET ORAL
Qty: 18 | Refills: 0
Start: 2023-09-06 | End: 2023-09-08

## 2023-09-06 RX ORDER — MORPHINE SULFATE 50 MG/1
4 CAPSULE, EXTENDED RELEASE ORAL ONCE
Refills: 0 | Status: DISCONTINUED | OUTPATIENT
Start: 2023-09-06 | End: 2023-09-06

## 2023-09-06 RX ORDER — SODIUM CHLORIDE 9 MG/ML
1000 INJECTION INTRAMUSCULAR; INTRAVENOUS; SUBCUTANEOUS ONCE
Refills: 0 | Status: COMPLETED | OUTPATIENT
Start: 2023-09-06 | End: 2023-09-06

## 2023-09-06 RX ORDER — CEFUROXIME AXETIL 250 MG
1 TABLET ORAL
Qty: 10 | Refills: 0
Start: 2023-09-06 | End: 2023-09-10

## 2023-09-06 RX ORDER — TAMSULOSIN HYDROCHLORIDE 0.4 MG/1
1 CAPSULE ORAL
Qty: 5 | Refills: 0
Start: 2023-09-06 | End: 2023-09-10

## 2023-09-06 RX ORDER — ONDANSETRON 8 MG/1
4 TABLET, FILM COATED ORAL ONCE
Refills: 0 | Status: COMPLETED | OUTPATIENT
Start: 2023-09-06 | End: 2023-09-06

## 2023-09-06 RX ADMIN — SODIUM CHLORIDE 1000 MILLILITER(S): 9 INJECTION INTRAMUSCULAR; INTRAVENOUS; SUBCUTANEOUS at 23:34

## 2023-09-06 RX ADMIN — MORPHINE SULFATE 4 MILLIGRAM(S): 50 CAPSULE, EXTENDED RELEASE ORAL at 23:35

## 2023-09-06 RX ADMIN — Medication 1000 MILLIGRAM(S): at 23:35

## 2023-09-06 RX ADMIN — SODIUM CHLORIDE 1000 MILLILITER(S): 9 INJECTION INTRAMUSCULAR; INTRAVENOUS; SUBCUTANEOUS at 22:34

## 2023-09-06 RX ADMIN — MORPHINE SULFATE 4 MILLIGRAM(S): 50 CAPSULE, EXTENDED RELEASE ORAL at 22:35

## 2023-09-06 RX ADMIN — Medication 400 MILLIGRAM(S): at 22:35

## 2023-09-06 RX ADMIN — ONDANSETRON 4 MILLIGRAM(S): 8 TABLET, FILM COATED ORAL at 22:35

## 2023-09-06 NOTE — ED PROVIDER NOTE - DIFFERENTIAL DIAGNOSIS
Patient presenting to the emergency room with a chief complaint of left flank pain.  Differential is broad includes possible renal stone versus pyelonephritis versus muscular pathology.  Will obtain screening labs check UA urine culture obtain CT renal stone hod medicate for pain and monitor Differential Diagnosis

## 2023-09-06 NOTE — ED ADULT NURSE NOTE - OBJECTIVE STATEMENT
Pt developed sudden sharp left side pain after urinating today at work. Pt has painful urination and his penis is burning. Urine is yellow. He had nausea earlier today associated with the pain. Pt denies fever, chills.

## 2023-09-06 NOTE — ED PROVIDER NOTE - PATIENT PORTAL LINK FT
You can access the FollowMyHealth Patient Portal offered by [facility] by registering at the following website: [email or web address]. By joining [facility]’s FollowMyHealth portal, you will also be able to view your health information using other applications (apps) compatible with our system.

## 2023-09-06 NOTE — ED PROVIDER NOTE - NSFOLLOWUPINSTRUCTIONS_ED_ALL_ED_FT
Return to the ED for any new or worsening symptoms including but not limited ot fever T max of 100.4 or greater, pain not controlled with the medication provided, vomiting not controlled with the medication  Flomax 1 tab nightly  Ceftin 1 tab 2 times a day  Zofran per label instructions as needed for nausea  Percocet per label instructions as needed for pain  Make sure to remain hydrated  Strain your urine to see if the stone passes  Follow up with urology call tomorrow to schedule follow up   Advance activity as tolerated    Kidney Stones  A body outline of the urinary tract with a close-up of a kidney showing kidney stones.  Kidney stones are solid, rock-like deposits that form inside of the kidneys. The kidneys are a pair of organs that make urine. A kidney stone may form in a kidney and move into other parts of the urinary tract, including the tubes that connect the kidneys to the bladder (ureters), the bladder, and the tube that carries urine out of the body (urethra). As the stone moves through these areas, it can cause intense pain and block the flow of urine.    Kidney stones are created when high levels of certain minerals are found in the urine. The stones are usually passed out of the body through urination, but in some cases, medical treatment may be needed to remove them.    What are the causes?  Kidney stones may be caused by:  A condition in which certain glands produce too much parathyroid hormone (primary hyperparathyroidism), which causes too much calcium buildup in the blood.  A buildup of uric acid crystals in the bladder (hyperuricosuria). Uric acid is a chemical that the body produces when you eat certain foods. It usually leaves the body in the urine.  Narrowing (stricture) of one or both of the ureters.  A kidney blockage that is present at birth (congenital obstruction).  Past surgery on the kidney or the ureters.  What increases the risk?  The following factors may make you more likely to develop this condition:  Having had a kidney stone in the past.  Having a family history of kidney stones.  Not drinking enough water.  Eating a diet that is high in protein, salt (sodium), or sugar.  Being overweight or obese.  What are the signs or symptoms?  Symptoms of a kidney stone may include:  Pain in the side of the abdomen, right below the ribs (flank pain). Pain usually spreads (radiates) to the groin.  Needing to urinate often or urgently.  Painful urination.  Blood in the urine (hematuria).  Nausea.  Vomiting.  Fever and chills.  How is this diagnosed?  This condition may be diagnosed based on:  Your symptoms and medical history.  A physical exam.  Blood tests.  Urine tests. These may be done before and after the stone passes out of your body through urination.  Imaging tests, such as a CT scan, abdominal X-ray, or ultrasound.  A procedure to examine the inside of the bladder (cystoscopy).  How is this treated?  Treatment for kidney stones depends on the size, location, and makeup of the stones. Kidney stones will often pass out of the body through urination. You may need to:  Increase your fluid intake to help pass the stone. In some cases, you may be given fluids through an IV and may need to be monitored in the hospital.  Take medicine for pain.  Make changes in your diet to help prevent kidney stones from coming back.  Sometimes, procedures are needed to remove a kidney stone. This may involve:  A procedure to break up kidney stones using:  A focused beam of light (laser therapy).  Shock waves (extracorporeal shock wave lithotripsy).  Surgery to remove kidney stones. This may be needed if you have severe pain or have stones that block your urinary tract.  Follow these instructions at home:  Medicines    Take over-the-counter and prescription medicines only as told by your health care provider.  Ask your health care provider if the medicine prescribed to you requires you to avoid driving or using heavy machinery.  Eating and drinking    Drink enough fluid to keep your urine pale yellow. You may be instructed to drink at least 8–10 glasses of water each day. This will help you pass the kidney stone.  If directed, change your diet. This may include:  Limiting how much sodium you eat.  Eating more fruits and vegetables.  Limiting how much animal protein you eat. Animal proteins include red meat, poultry, fish, and eggs.  Eating a normal amount of calcium (1,000–1,300 mg per day).  Follow instructions from your health care provider about eating or drinking restrictions.  General instructions    Collect urine samples as told by your health care provider. You may need to collect a urine sample:  24 hours after you pass the stone.  8–12 weeks after you pass the kidney stone, and every 6–12 months after that.  Strain your urine every time you urinate, for as long as directed. Use the strainer that your health care provider recommends.  Do not throw out the kidney stone after passing it. Keep the stone so it can be tested by your health care provider. Testing the makeup of your kidney stone may help prevent you from getting kidney stones in the future.  Keep all follow-up visits. You may need follow-up X-rays or ultrasounds to make sure that your stone has passed.  How is this prevented?  A comparison of three sample cups showing dark yellow, yellow, and pale yellow urine.  To prevent another kidney stone:  Drink enough fluid to keep your urine pale yellow. This is the best way to prevent kidney stones.  Eat a healthy diet. Follow recommendations from your health care provider about foods to avoid. Recommendations vary depending on the type of kidney stone that you have. You may be instructed to eat a low-protein diet.  Maintain a healthy weight.  Where to find more information  [employer] Kid[employer] Foundation (NKF): www.kidney.org  [facility] ([facility]): www.urologyhealth.org  Contact a health care provider if:  You have pain that gets worse or does not get better with medicine.  Get help right away if:  You have a fever or chills.  You develop severe pain.  You develop new abdominal pain.  You faint.  You are unable to urinate.  Summary  Kidney stones are solid, rock-like deposits that form inside of the kidneys.  Kidney stones can cause nausea, vomiting, blood in the urine, abdominal pain, and the urge to urinate often.  Treatment for kidney stones depends on the size, location, and makeup of the stones. Kidney stones will often pass out of the body through urination.  Kidney stones can be prevented by drinking enough fluids, eating a healthy diet, and maintaining a healthy weight.  This information is not intended to replace advice given to you by your health care provider. Make sure you discuss any questions you have with your health care provider.

## 2023-09-06 NOTE — ED PROVIDER NOTE - CARE PROVIDER_API CALL
Josephine Francisco  Urology  [address] [place] [address], [address]  [place] [address]  Phone: [phone]  Fax: [phone]  Follow Up Time:

## 2023-09-06 NOTE — ED PROVIDER NOTE - CLINICAL SUMMARY MEDICAL DECISION MAKING FREE TEXT BOX
Patient is a 48-year-old male who presents to the emergency room with a chief complaint of left flank pain.  Past medical history of asthma hypothyroidism hypertension.  He reports that several hours ago he developed sharp stabbing left flank pain shortly after urinating.  He also noted some dysuria and penile burning.  Initially thought it was gas pain so he tried Gas-X and Tums without relief.  Denies any acute injury or trauma to the back.  No prior similar episodes.  Denies any fevers chills diarrhea chest pain or shortness of breath.  Did report associated nausea with dry heaves.  No history of renal stones. Patient presenting to the emergency room with a chief complaint of left flank pain.  Differential is broad includes possible renal stone versus pyelonephritis versus muscular pathology.  Will obtain screening labs check UA urine culture obtain CT renal stone hod medicate for pain and monitor Patient is a 48-year-old male who presents to the emergency room with a chief complaint of left flank pain.  Past medical history of asthma hypothyroidism hypertension.  He reports that several hours ago he developed sharp stabbing left flank pain shortly after urinating.  He also noted some dysuria and penile burning.  Initially thought it was gas pain so he tried Gas-X and Tums without relief.  Denies any acute injury or trauma to the back.  No prior similar episodes.  Denies any fevers chills diarrhea chest pain or shortness of breath.  Did report associated nausea with dry heaves.  No history of renal stones. Patient presenting to the emergency room with a chief complaint of left flank pain.  Differential is broad includes possible renal stone versus pyelonephritis versus muscular pathology.  Will obtain screening labs check UA urine culture obtain CT renal stone hod medicate for pain and monitor. Results of labs reviewed patient with a white count of 13 mild renal insufficiency no evidence of overt urinary tract infection.  CT imaging reveals fullness of the left renal collecting system with trace left perinephric stranding secondary to an obstructing 3 mm left UVJ stone.  Patient resting comfortably pain controlled at this time.  Stable for discharge home with strict outpatient return precautions. Copy of results provided

## 2023-09-06 NOTE — ED PROVIDER NOTE - OBJECTIVE STATEMENT
Patient is a 48-year-old male who presents to the emergency room with a chief complaint of left flank pain.  Past medical history of asthma hypothyroidism hypertension.  He reports that several hours ago he developed sharp stabbing left flank pain shortly after urinating.  He also noted some dysuria and penile burning.  Initially thought it was gas pain so he tried Gas-X and Tums without relief.  Denies any acute injury or trauma to the back.  No prior similar episodes.  Denies any fevers chills diarrhea chest pain or shortness of breath.  Did report associated nausea with dry heaves.  No history of renal stones.

## 2023-09-08 LAB
CULTURE RESULTS: SIGNIFICANT CHANGE UP
SPECIMEN SOURCE: SIGNIFICANT CHANGE UP

## 2023-09-18 ENCOUNTER — APPOINTMENT (OUTPATIENT)
Dept: UROLOGY | Facility: CLINIC | Age: 48
End: 2023-09-18
Payer: COMMERCIAL

## 2023-09-18 DIAGNOSIS — N20.0 CALCULUS OF KIDNEY: ICD-10-CM

## 2023-09-18 DIAGNOSIS — Z86.79 PERSONAL HISTORY OF OTHER DISEASES OF THE CIRCULATORY SYSTEM: ICD-10-CM

## 2023-09-18 DIAGNOSIS — E21.3 HYPERPARATHYROIDISM, UNSPECIFIED: ICD-10-CM

## 2023-09-18 DIAGNOSIS — N20.1 CALCULUS OF URETER: ICD-10-CM

## 2023-09-18 DIAGNOSIS — Z87.09 PERSONAL HISTORY OF OTHER DISEASES OF THE RESPIRATORY SYSTEM: ICD-10-CM

## 2023-09-18 DIAGNOSIS — Z82.49 FAMILY HISTORY OF ISCHEMIC HEART DISEASE AND OTHER DISEASES OF THE CIRCULATORY SYSTEM: ICD-10-CM

## 2023-09-18 PROCEDURE — 99204 OFFICE O/P NEW MOD 45 MIN: CPT

## 2023-09-21 LAB
APPEARANCE: CLEAR
BACTERIA UR CULT: NORMAL
BACTERIA: NEGATIVE /HPF
BILIRUBIN URINE: NEGATIVE
BLOOD URINE: NEGATIVE
CAST: 0 /LPF
COLOR: YELLOW
CORTIS SERPL-MCNC: 6.9 UG/DL
EPITHELIAL CELLS: 0 /HPF
ESTIMATED AVERAGE GLUCOSE: 114 MG/DL
ESTRADIOL SERPL-MCNC: 8 PG/ML
FSH SERPL-MCNC: 1.9 IU/L
GLUCOSE QUALITATIVE U: NEGATIVE MG/DL
HBA1C MFR BLD HPLC: 5.6 %
KETONES URINE: NEGATIVE MG/DL
LEUKOCYTE ESTERASE URINE: NEGATIVE
LH SERPL-ACNC: 4.4 IU/L
MICROSCOPIC-UA: NORMAL
NITRITE URINE: NEGATIVE
PH URINE: 7
PROLACTIN SERPL-MCNC: 15.9 NG/ML
PROTEIN URINE: NEGATIVE MG/DL
RED BLOOD CELLS URINE: 0 /HPF
SHBG SERPL-SCNC: 28 NMOL/L
SPECIFIC GRAVITY URINE: 1.02
TESTOST SERPL-MCNC: 248 NG/DL
UROBILINOGEN URINE: 0.2 MG/DL
WHITE BLOOD CELLS URINE: 0 /HPF

## 2023-09-25 LAB
KIDNEY STONE SOURCE: NORMAL
NIDUS STONE QN: NORMAL
RESULT COMMENT: NORMAL

## 2023-09-27 LAB — DHEA SERPL-MCNC: 88 NG/DL

## 2023-11-24 ENCOUNTER — TRANSCRIPTION ENCOUNTER (OUTPATIENT)
Age: 48
End: 2023-11-24

## 2023-12-01 LAB — TESTOST SERPL-MCNC: 266 NG/DL

## 2024-01-11 ENCOUNTER — APPOINTMENT (OUTPATIENT)
Dept: PULMONOLOGY | Facility: CLINIC | Age: 49
End: 2024-01-11
Payer: COMMERCIAL

## 2024-01-11 PROCEDURE — 99214 OFFICE O/P EST MOD 30 MIN: CPT | Mod: 95

## 2024-01-16 NOTE — HISTORY OF PRESENT ILLNESS
[CPAP: ___ cmH2O] : CPAP: [unfilled] cmH2O [Nocturnal Oxygen] : The patient does not use nocturnal oxygen [FreeTextEntry1] : Dream[employer]ration Auto CPAP provided by [employer], [facility], on 1/29/2021

## 2024-01-16 NOTE — REASON FOR VISIT
[Home] : at home, [unfilled] , at the time of the visit. [Medical Office: ([facility])___] : at the medical office located in  [Patient] : the patient [Follow-Up] : a follow-up visit [Sleep Apnea] : sleep apnea

## 2024-01-16 NOTE — ASSESSMENT
[FreeTextEntry1] : 47 y/o M with moderate ISMAEL, severe in REM sleep, suboptimal treatment with OMAD, currently treated with CPAP, with comorbid Asthma, HTN, GERD, Hypothyroidism, presents for a follow-up visit.   --The patient continues to derive benefit from CPAP, utilizing a FFM, with normalization of AHI from 21.3/hr to 1.3/hr on 12 cmH20 and resolution to apnea-related symptoms. Compliance shows 93.3% of usage, averaging 6 hrs and 28 minutes. Therefore, the patient will continue with nightly CPAP use, as prescribed.  --Rx for renewal of pap supplies was placed to [facility], [facility].  --Weight loss was encouraged. The role of weight loss and its positive impact on the severity of apneas was discussed with the patient.   --Dangers of insufficient sleep time and strategies to improve this was discussed with the patient. Patient was advised to turn the TV off and go to bed when sleepy and apply therapy.  --Sleep hygiene and stimulus control measures were reviewed with the patient at length.   The ramifications of ISMAEL and the importance of continued treatment was discussed with the patient.  F/U in 1-year or sooner if needed.

## 2024-01-16 NOTE — REVIEW OF SYSTEMS
[Thyroid Disease] : thyroid disease [Unintentional Sleep while inactive] : unintentional sleep while inactive [Awakes Unrefreshed] : nonrestorative sleep [Recent Wt Gain (___ Lbs)] : recent [unfilled] ~Ulb weight gain [Negative] : Psychiatric [Fatigue] : no fatigue [A.M. Dry Mouth] : no a.m. dry mouth [Chest Pain] : no chest pain [CHF] : no congestive heart failure [Diabetes] : no diabetes  [History of Iron Deficiency] : no history of iron deficiency [A.M. Headache] : no headache present upon awakening [Snoring] : no snoring [Witnessed Apneas] : demonstrated no ~M apnea [Frequent Nocturnal Awakenings] : no nocturnal awakenings from sleep [Unintentional Sleep while active] : no unintentional sleep while active [Awakes With Headache] : awakes without a headache [Awakes With Dry Mouth] : awakes without dry mouth [Recent Wt Loss (___ Lbs)] : no recent weight loss [Difficulty Initiating Sleep] : no difficulty falling asleep [Difficulty Maintaining Sleep] : no difficulty maintaining sleep [Irresistible urge to move legs] : no irresistible urge to move legs because of lower extremity discomfort [LE discomfort relieved by movement] : lower extremity discomfort not relieved by movement [Sleep Disturbances due to LE symptoms] : ~T no sleep disturbances due to lower extremity symptoms [Unusual Sleep Behavior] : no unusual sleep behavior [Cataplexy] :  no cataplexy [Hypnogogic Hallucinations] : no hypnogogic hallucinations [Hypnopompic Hallucinations] : no hypnopompic hallucinations [Sleep Paralysis] : no sleep paralysis [FreeTextEntry8] : asthma -- on Symbicort and rescue inhaler prn. managed by pcp [de-identified] : h/o migraines [FreeTextEntry1] : 12 am [FreeTextEntry2] : 7 am [FreeTextEntry3] : 5-minutes [FreeTextEntry4] : 1 x [FreeTextEntry5] : "pretty quickly" [FreeTextEntry6] : 6.5 hours  [FreeTextEntry7] : none

## 2024-01-25 ENCOUNTER — APPOINTMENT (OUTPATIENT)
Dept: INTERNAL MEDICINE | Facility: CLINIC | Age: 49
End: 2024-01-25
Payer: COMMERCIAL

## 2024-01-25 ENCOUNTER — NON-APPOINTMENT (OUTPATIENT)
Age: 49
End: 2024-01-25

## 2024-01-25 VITALS
SYSTOLIC BLOOD PRESSURE: 115 MMHG | WEIGHT: 240 LBS | DIASTOLIC BLOOD PRESSURE: 80 MMHG | BODY MASS INDEX: 33.6 KG/M2 | TEMPERATURE: 97 F | HEIGHT: 71 IN | OXYGEN SATURATION: 98 % | HEART RATE: 80 BPM

## 2024-01-25 DIAGNOSIS — Z12.83 ENCOUNTER FOR SCREENING FOR MALIGNANT NEOPLASM OF SKIN: ICD-10-CM

## 2024-01-25 DIAGNOSIS — Z00.00 ENCOUNTER FOR GENERAL ADULT MEDICAL EXAMINATION W/OUT ABNORMAL FINDINGS: ICD-10-CM

## 2024-01-25 DIAGNOSIS — G47.33 OBSTRUCTIVE SLEEP APNEA (ADULT) (PEDIATRIC): ICD-10-CM

## 2024-01-25 DIAGNOSIS — E66.9 OBESITY, UNSPECIFIED: ICD-10-CM

## 2024-01-25 DIAGNOSIS — J45.20 MILD INTERMITTENT ASTHMA, UNCOMPLICATED: ICD-10-CM

## 2024-01-25 DIAGNOSIS — N52.9 MALE ERECTILE DYSFUNCTION, UNSPECIFIED: ICD-10-CM

## 2024-01-25 DIAGNOSIS — E55.9 VITAMIN D DEFICIENCY, UNSPECIFIED: ICD-10-CM

## 2024-01-25 DIAGNOSIS — R19.5 OTHER FECAL ABNORMALITIES: ICD-10-CM

## 2024-01-25 PROCEDURE — 99396 PREV VISIT EST AGE 40-64: CPT | Mod: 25

## 2024-01-25 PROCEDURE — 36415 COLL VENOUS BLD VENIPUNCTURE: CPT

## 2024-01-25 PROCEDURE — 99214 OFFICE O/P EST MOD 30 MIN: CPT | Mod: 25

## 2024-01-25 PROCEDURE — 93000 ELECTROCARDIOGRAM COMPLETE: CPT

## 2024-01-25 RX ORDER — FLUTICASONE PROPIONATE 50 MCG
50 SPRAY, SUSPENSION NASAL
Refills: 0 | Status: DISCONTINUED | COMMUNITY
End: 2024-01-25

## 2024-01-25 RX ORDER — LEVOTHYROXINE SODIUM 0.14 MG/1
137 TABLET ORAL
Qty: 90 | Refills: 1 | Status: ACTIVE | COMMUNITY
Start: 2021-11-05 | End: 1900-01-01

## 2024-01-25 RX ORDER — AZELASTINE HYDROCHLORIDE 137 UG/1
137 SPRAY, METERED NASAL
Qty: 90 | Refills: 0 | Status: DISCONTINUED | COMMUNITY
Start: 2022-07-27 | End: 2024-01-25

## 2024-01-25 RX ORDER — FAMOTIDINE 40 MG/1
40 TABLET, FILM COATED ORAL
Qty: 90 | Refills: 0 | Status: DISCONTINUED | COMMUNITY
Start: 2022-10-26 | End: 2024-01-25

## 2024-01-25 RX ORDER — FLUTICASONE PROPIONATE 50 UG/1
50 SPRAY, METERED NASAL DAILY
Qty: 1 | Refills: 3 | Status: DISCONTINUED | COMMUNITY
Start: 2023-01-19 | End: 2024-01-25

## 2024-01-25 RX ORDER — RAMIPRIL 5 MG/1
5 CAPSULE ORAL
Qty: 90 | Refills: 1 | Status: ACTIVE | COMMUNITY
Start: 2019-05-23 | End: 1900-01-01

## 2024-01-25 RX ORDER — BUDESONIDE AND FORMOTEROL FUMARATE DIHYDRATE 160; 4.5 UG/1; UG/1
160-4.5 AEROSOL RESPIRATORY (INHALATION) TWICE DAILY
Qty: 1 | Refills: 2 | Status: DISCONTINUED | COMMUNITY
Start: 2023-05-19 | End: 2024-01-25

## 2024-01-26 LAB
25(OH)D3 SERPL-MCNC: 41.2 NG/ML
ALBUMIN SERPL ELPH-MCNC: 4.8 G/DL
ALP BLD-CCNC: 74 U/L
ALT SERPL-CCNC: 23 U/L
ANION GAP SERPL CALC-SCNC: 10 MMOL/L
AST SERPL-CCNC: 23 U/L
BASOPHILS # BLD AUTO: 0.02 K/UL
BASOPHILS NFR BLD AUTO: 0.3 %
BILIRUB SERPL-MCNC: 0.4 MG/DL
BUN SERPL-MCNC: 17 MG/DL
CALCIUM SERPL-MCNC: 9.6 MG/DL
CHLORIDE SERPL-SCNC: 102 MMOL/L
CHOLEST SERPL-MCNC: 171 MG/DL
CO2 SERPL-SCNC: 26 MMOL/L
CREAT SERPL-MCNC: 1.1 MG/DL
EGFR: 83 ML/MIN/1.73M2
EOSINOPHIL # BLD AUTO: 0.12 K/UL
EOSINOPHIL NFR BLD AUTO: 1.9 %
ESTIMATED AVERAGE GLUCOSE: 108 MG/DL
GLUCOSE SERPL-MCNC: 95 MG/DL
HBA1C MFR BLD HPLC: 5.4 %
HCT VFR BLD CALC: 48.7 %
HDLC SERPL-MCNC: 57 MG/DL
HGB BLD-MCNC: 15.8 G/DL
IMM GRANULOCYTES NFR BLD AUTO: 0.2 %
LDLC SERPL CALC-MCNC: 83 MG/DL
LDLC SERPL DIRECT ASSAY-MCNC: 72 MG/DL
LYMPHOCYTES # BLD AUTO: 2.13 K/UL
LYMPHOCYTES NFR BLD AUTO: 33.7 %
MAN DIFF?: NORMAL
MCHC RBC-ENTMCNC: 30.2 PG
MCHC RBC-ENTMCNC: 32.4 GM/DL
MCV RBC AUTO: 92.9 FL
MONOCYTES # BLD AUTO: 0.6 K/UL
MONOCYTES NFR BLD AUTO: 9.5 %
NEUTROPHILS # BLD AUTO: 3.44 K/UL
NEUTROPHILS NFR BLD AUTO: 54.4 %
NONHDLC SERPL-MCNC: 114 MG/DL
PLATELET # BLD AUTO: 235 K/UL
POTASSIUM SERPL-SCNC: 4.3 MMOL/L
PROT SERPL-MCNC: 7.5 G/DL
PSA SERPL-MCNC: 0.89 NG/ML
RBC # BLD: 5.24 M/UL
RBC # FLD: 13 %
SODIUM SERPL-SCNC: 138 MMOL/L
T4 FREE SERPL-MCNC: 1.5 NG/DL
TRIGL SERPL-MCNC: 184 MG/DL
TSH SERPL-ACNC: 1.04 UIU/ML
WBC # FLD AUTO: 6.32 K/UL

## 2024-01-29 ENCOUNTER — TRANSCRIPTION ENCOUNTER (OUTPATIENT)
Age: 49
End: 2024-01-29

## 2024-01-29 LAB
TESTOST FREE SERPL-MCNC: 4.7 PG/ML
TESTOST SERPL-MCNC: 271 NG/DL

## 2024-02-06 ENCOUNTER — TRANSCRIPTION ENCOUNTER (OUTPATIENT)
Age: 49
End: 2024-02-06

## 2024-04-17 ENCOUNTER — APPOINTMENT (OUTPATIENT)
Dept: ENDOCRINOLOGY | Facility: CLINIC | Age: 49
End: 2024-04-17
Payer: COMMERCIAL

## 2024-04-17 VITALS
DIASTOLIC BLOOD PRESSURE: 81 MMHG | SYSTOLIC BLOOD PRESSURE: 150 MMHG | OXYGEN SATURATION: 99 % | BODY MASS INDEX: 33.88 KG/M2 | HEART RATE: 89 BPM | HEIGHT: 71 IN | WEIGHT: 242 LBS

## 2024-04-17 PROCEDURE — 99204 OFFICE O/P NEW MOD 45 MIN: CPT

## 2024-04-17 RX ORDER — CLOMIPHENE CITRATE 50 MG/1
50 TABLET ORAL
Qty: 8 | Refills: 5 | Status: ACTIVE | COMMUNITY
Start: 2024-04-17 | End: 1900-01-01

## 2024-04-17 NOTE — CONSULT LETTER
[Dear  ___] : Dear  [unfilled], [Consult Letter:] : I had the pleasure of evaluating your patient, [unfilled]. [Please see my note below.] : Please see my note below. [Consult Closing:] : Thank you very much for allowing me to participate in the care of this patient.  If you have any questions, please do not hesitate to contact me. [Sincerely,] : Sincerely, [FreeTextEntry2] : Dr. Renay Kelly [address] [place] [address]. [address] [place] [address] [FreeTextEntry3] : Craig Mendez DO Division of Endocrinology [facility]  of Medicine [facility] [employer] Director of [facility]

## 2024-04-17 NOTE — PHYSICAL EXAM
[Alert] : alert [Well Nourished] : well nourished [Healthy Appearance] : healthy appearance [No Acute Distress] : no acute distress [Well Developed] : well developed [EOMI] : extra ocular movement intact [Normal Hearing] : hearing was normal [Supple] : the neck was supple [Thyroid Not Enlarged] : the thyroid was not enlarged [No Respiratory Distress] : no respiratory distress [No Accessory Muscle Use] : no accessory muscle use [Normal Rate and Effort] : normal respiratory rate and effort [Clear to Auscultation] : lungs were clear to auscultation bilaterally [Normal S1, S2] : normal S1 and S2 [Normal Rate] : heart rate was normal [Regular Rhythm] : with a regular rhythm [No Edema] : no peripheral edema [No Galactorrhea] : no galactorrhea [Normal Bowel Sounds] : normal bowel sounds [Not Tender] : non-tender [Not Distended] : not distended [Soft] : abdomen soft [Penis Abnormality] : the penis was normal [Testes Swelling] : the testicles were not swollen [Scrotum] : the scrotum was normal [Testes Mass (___cm)] : there were no testicular masses [Normal Testicular Size] : normal testicular size [Normal Pattern Pubic Hair] : normal male pattern pubic hair [No Stigmata of Cushings Syndrome] : no stigmata of Cushings Syndrome [No Clubbing, Cyanosis] : no clubbing  or cyanosis of the fingernails [No Involuntary Movements] : no involuntary movements were seen [Normal Strength/Tone] : muscle strength and tone were normal [Oriented x3] : oriented to person, place, and time [Normal Affect] : the affect was normal [Normal Mood] : the mood was normal

## 2024-04-17 NOTE — DATA REVIEWED
[FreeTextEntry1] : Labs 1/2024: CBC normal A1c 5.4%  Chol 171 HDL 57 LDL 83 CMP normal TSH 1.04 Free T4 1.5 Vit D 41.2 PSA 0.89 Testosterone 271 Free Testosterone 271  Labs 11/2023: Testosterone 266  Labs 9/2023: Cortisol PM 6.9 A1c 5.6% LH 4.4 FSH 1.9 Testosterone 248 Estradiol 8 SHBG 28 DHEAS 88  Labs 5/2023: TSH 1.50 Prolacitn 15.9

## 2024-04-17 NOTE — REVIEW OF SYSTEMS
[Erectile Dysfunction] : erectile dysfunction [Headaches] : headaches [All other systems negative] : All other systems negative [Fatigue] : no fatigue [Recent Weight Gain (___ Lbs)] : no recent weight gain [Recent Weight Loss (___ Lbs)] : no recent weight loss [Visual Field Defect] : no visual field defect [Dysphagia] : no dysphagia [Dysphonia] : no dysphonia [Chest Pain] : no chest pain [Palpitations] : no palpitations [Shortness Of Breath] : no shortness of breath [Nausea] : no nausea [Constipation] : no constipation [Vomiting] : no vomiting [Diarrhea] : no diarrhea [Polyuria] : no polyuria [Joint Pain] : no joint pain [Acanthosis] : no acanthosis  [Depression] : no depression [Polydipsia] : no polydipsia [Cold Intolerance] : no cold intolerance [Heat Intolerance] : no heat intolerance [de-identified] : +occasional migraines

## 2024-04-17 NOTE — ASSESSMENT
[FreeTextEntry1] : 49 y/o M w/  1. Low Testosterone-- confirmed on multiple tests performed in the morning with associated ow Free Testosterone and normal range SHBG. Hypogonadism hypogonadotropic with inappropriately normal LH and FSH level. No evidence of hyperprolactinemia or other pituitary deficiencies. Consider symptomatic treatment with Viagra or Cialis as main concern is erectile dysfunction without any other associated symptoms of hypogonadism at this time. Would ideally try to get testosterone level > 300 for overall bone health as well. Recommend trial of Clomid 50mg qod for the next 1-2 months and reassess symptoms and testosterone level at that time. Will consider exogenous testosterone replacement if no benefit on Clomid and persistently low levels.   2. Hypothyroidism- clinically and chemically euthyroid/. Continue with levothyroxine 137 mcg daily.   3. HTN- c/w ramipril

## 2024-04-17 NOTE — HISTORY OF PRESENT ILLNESS
[FreeTextEntry1] : 49 y/o M referred for low testosterone Testosterone level checked by PCP for symptoms of difficulty maintaining erections and premature ejaculation. No decrease in libido. Normal energy level. No depression.  Testosterone level checked on multiple occasions in the morning with levels <300 with normal SHBG and low Free Testosterone.  Has daughter born in 2012. No fertility assistance needed. On Symbicort. No other steroids. No anabolic steroid use. Has CPAP for sleep apnea. Reports adherence to CPAP.  +migraines a few times a year. No vision changes. No testicular changes No changes to shoe, hat, or hand size. No diabetes, No striae. No hx of easy bruising or fractures.   Hx of hypothyroidism on 137 mcg daily with adherence.   HTN on ramipril

## 2024-04-17 NOTE — REASON FOR VISIT
[Initial Evaluation] : an initial evaluation [Hypogonadism] : hypogonadism [FreeTextEntry2] : Dr. Renay Kelly

## 2024-06-03 LAB
ALBUMIN SERPL ELPH-MCNC: 4.5 G/DL
ALP BLD-CCNC: 64 U/L
ALT SERPL-CCNC: 19 U/L
ANION GAP SERPL CALC-SCNC: 12 MMOL/L
AST SERPL-CCNC: 20 U/L
BASOPHILS # BLD AUTO: 0.03 K/UL
BASOPHILS NFR BLD AUTO: 0.5 %
BILIRUB SERPL-MCNC: 0.4 MG/DL
BUN SERPL-MCNC: 20 MG/DL
CALCIUM SERPL-MCNC: 9.6 MG/DL
CHLORIDE SERPL-SCNC: 100 MMOL/L
CHOLEST SERPL-MCNC: 196 MG/DL
CO2 SERPL-SCNC: 27 MMOL/L
CREAT SERPL-MCNC: 1.29 MG/DL
EGFR: 69 ML/MIN/1.73M2
EOSINOPHIL # BLD AUTO: 0.11 K/UL
EOSINOPHIL NFR BLD AUTO: 1.7 %
ESTIMATED AVERAGE GLUCOSE: 111 MG/DL
ESTRADIOL SERPL-MCNC: 35 PG/ML
GLUCOSE SERPL-MCNC: 92 MG/DL
HBA1C MFR BLD HPLC: 5.5 %
HCT VFR BLD CALC: 49.1 %
HDLC SERPL-MCNC: 55 MG/DL
HGB BLD-MCNC: 15.9 G/DL
IMM GRANULOCYTES NFR BLD AUTO: 0.2 %
LDLC SERPL CALC-MCNC: 103 MG/DL
LYMPHOCYTES # BLD AUTO: 1.99 K/UL
LYMPHOCYTES NFR BLD AUTO: 31.6 %
MAN DIFF?: NORMAL
MCHC RBC-ENTMCNC: 29.5 PG
MCHC RBC-ENTMCNC: 32.4 GM/DL
MCV RBC AUTO: 91.1 FL
MONOCYTES # BLD AUTO: 0.49 K/UL
MONOCYTES NFR BLD AUTO: 7.8 %
NEUTROPHILS # BLD AUTO: 3.67 K/UL
NEUTROPHILS NFR BLD AUTO: 58.2 %
NONHDLC SERPL-MCNC: 141 MG/DL
PLATELET # BLD AUTO: 249 K/UL
POTASSIUM SERPL-SCNC: 4.8 MMOL/L
PROT SERPL-MCNC: 7.1 G/DL
RBC # BLD: 5.39 M/UL
RBC # FLD: 13.3 %
SODIUM SERPL-SCNC: 139 MMOL/L
T4 FREE SERPL-MCNC: 1.3 NG/DL
TESTOST SERPL-MCNC: 594 NG/DL
TRIGL SERPL-MCNC: 225 MG/DL
TSH SERPL-ACNC: 1.52 UIU/ML
WBC # FLD AUTO: 6.3 K/UL

## 2024-06-05 ENCOUNTER — APPOINTMENT (OUTPATIENT)
Dept: ENDOCRINOLOGY | Facility: CLINIC | Age: 49
End: 2024-06-05
Payer: COMMERCIAL

## 2024-06-05 VITALS
HEIGHT: 71 IN | DIASTOLIC BLOOD PRESSURE: 86 MMHG | BODY MASS INDEX: 33.6 KG/M2 | HEART RATE: 101 BPM | WEIGHT: 240 LBS | SYSTOLIC BLOOD PRESSURE: 140 MMHG | OXYGEN SATURATION: 97 %

## 2024-06-05 DIAGNOSIS — R79.89 OTHER SPECIFIED ABNORMAL FINDINGS OF BLOOD CHEMISTRY: ICD-10-CM

## 2024-06-05 DIAGNOSIS — E89.0 POSTPROCEDURAL HYPOTHYROIDISM: ICD-10-CM

## 2024-06-05 DIAGNOSIS — I10 ESSENTIAL (PRIMARY) HYPERTENSION: ICD-10-CM

## 2024-06-05 PROCEDURE — 99214 OFFICE O/P EST MOD 30 MIN: CPT

## 2024-06-05 RX ORDER — TADALAFIL 10 MG/1
10 TABLET, FILM COATED ORAL
Qty: 20 | Refills: 3 | Status: ACTIVE | COMMUNITY
Start: 2024-06-05 | End: 1900-01-01

## 2024-06-05 NOTE — REVIEW OF SYSTEMS
[Erectile Dysfunction] : erectile dysfunction [Headaches] : headaches [All other systems negative] : All other systems negative [Fatigue] : no fatigue [Recent Weight Gain (___ Lbs)] : no recent weight gain [Recent Weight Loss (___ Lbs)] : no recent weight loss [Visual Field Defect] : no visual field defect [Dysphagia] : no dysphagia [Dysphonia] : no dysphonia [Chest Pain] : no chest pain [Palpitations] : no palpitations [Shortness Of Breath] : no shortness of breath [Nausea] : no nausea [Constipation] : no constipation [Vomiting] : no vomiting [Diarrhea] : no diarrhea [Polyuria] : no polyuria [Joint Pain] : no joint pain [Acanthosis] : no acanthosis  [Depression] : no depression [Polydipsia] : no polydipsia [Cold Intolerance] : no cold intolerance [Heat Intolerance] : no heat intolerance [de-identified] : +occasional migraines

## 2024-06-05 NOTE — DATA REVIEWED
[FreeTextEntry1] : Labs 6/2024: Testosterone 594 CBC normal A1c 5.5% Estradiol 35 TSH 1.52 Free T4 1.3 CMP normal  Chol 196 HDL 55   Labs 1/2024: CBC normal A1c 5.4%  Chol 171 HDL 57 LDL 83 CMP normal TSH 1.04 Free T4 1.5 Vit D 41.2 PSA 0.89 Testosterone 271 Free Testosterone 271  Labs 11/2023: Testosterone 266  Labs 9/2023: Cortisol PM 6.9 A1c 5.6% LH 4.4 FSH 1.9 Testosterone 248 Estradiol 8 SHBG 28 DHEAS 88  Labs 5/2023: TSH 1.50 Prolacitn 15.9

## 2024-06-05 NOTE — HISTORY OF PRESENT ILLNESS
[FreeTextEntry1] : 49 y/o M referred for low testosterone Testosterone level checked by PCP for symptoms of difficulty maintaining erections and premature ejaculation. No decrease in libido. Normal energy level. No depression.  Testosterone level checked on multiple occasions in the morning with levels <300 with normal SHBG and low Free Testosterone.  Seen initially by me 4/2024. Recommended trial of Clomid 25mg every other day with improvement in testosterone now almost 600 but no improvement in erections.  Has daughter born in 2012. No fertility assistance needed. On Symbicort. No other steroids. No anabolic steroid use. Has CPAP for sleep apnea. Reports adherence to CPAP.  +migraines a few times a year. No vision changes. No testicular changes No changes to shoe, hat, or hand size. No diabetes, No striae. No hx of easy bruising or fractures.   Hx of hypothyroidism on 137 mcg daily with adherence. Chemically euthyroid as of 6/2024.   HTN on ramipril

## 2024-06-05 NOTE — ASSESSMENT
[FreeTextEntry1] : 49 y/o M w/  1. Low Testosterone-- confirmed on multiple tests performed in the morning with associated ow Free Testosterone and normal range SHBG. Hypogonadism hypogonadotropic with inappropriately normal LH and FSH level. No evidence of hyperprolactinemia or other pituitary deficiencies. Now on Clomid 25mg qod with improvement in testosterone to almost 600 without improvement in symptoms especially ED. Consider symptomatic treatment with Viagra or Cialis as main concern is erectile dysfunction without any other associated symptoms of hypogonadism at this time. Would ideally try to keep testosterone level > 300 for overall bone health as well.   2. Hypothyroidism- clinically and chemically euthyroid/. Continue with levothyroxine 137 mcg daily.   3. HTN- c/w ramipril

## 2024-07-05 DIAGNOSIS — K57.30 DIVERTICULOSIS OF LARGE INTESTINE W/OUT PERFORATION OR ABSCESS W/OUT BLEEDING: ICD-10-CM

## 2024-08-07 ENCOUNTER — TRANSCRIPTION ENCOUNTER (OUTPATIENT)
Age: 49
End: 2024-08-07

## 2024-08-08 ENCOUNTER — APPOINTMENT (OUTPATIENT)
Dept: INTERNAL MEDICINE | Facility: CLINIC | Age: 49
End: 2024-08-08

## 2024-08-08 PROBLEM — K42.9 UMBILICAL HERNIA: Status: ACTIVE | Noted: 2024-08-08

## 2024-08-08 PROCEDURE — 99214 OFFICE O/P EST MOD 30 MIN: CPT

## 2024-08-08 PROCEDURE — G2211 COMPLEX E/M VISIT ADD ON: CPT

## 2024-08-08 NOTE — HISTORY OF PRESENT ILLNESS
[FreeTextEntry1] : HTN [de-identified] : reviewed 6/1/24 labs - trig 225 otherwise nl CBC, CMP diet=-stopped drinking soda. better about eating late, still eating bagel exercise- 30 min / d- 4-5 days/wk HTN- compliant with med. home BP  120's/80.  pt BP machine matched office readings.  no CP or SOB   Patient is compliant with diet.  He  has no CP or SOB. thyroid- reviewed 12/22- nl labs.  , 12/23/21- US thyroid - atrophy.  compliant w meds. no palpit. temp intolerance low testosterone- reviewed 4/17/24 Endo notes ISMAEL- CPAP.  reviewed 1/11/24  Sleep Med note  - using CPAP Being tx for GERD by ENT- has f/u 7/19/18- EGD- mild gastritis - no sympt off famotidine thinks he has hernia- hurts only on palp asthma- controlled for a few yrs.  better w exercise.  compliant w symbicort. no needed albuterol for mo.Sees Allergist- Dr. Owens had kidney stone- 09/2023.  ED- seen Urology- reviewed 9/18/23 Urology notes- reviewed 11/30/23 labs - low testosterone had colonoscopy- in June- nl as per pt

## 2024-08-08 NOTE — PHYSICAL EXAM
[de-identified] : reducible umbilical hernia  [TextEntry] : Constitutional: no acute distress, well nourished, well developed and well-appearing. Pulmonary: no respiratory distress, lungs were clear to auscultation bilaterally, no accessory muscle use. Cardiac: normal rate, with a regular rhythm, normal S1 and S2 and no murmur heard.  Vascular: there was no peripheral edema. Abdomen: abdomen soft, non-tender, non-distended, no abdominal mass palpated and normal bowel sounds. Psychiatric: the affect was normal and insight and judgement were intact

## 2024-09-11 ENCOUNTER — APPOINTMENT (OUTPATIENT)
Dept: ENDOCRINOLOGY | Facility: CLINIC | Age: 49
End: 2024-09-11
Payer: COMMERCIAL

## 2024-09-11 VITALS
HEART RATE: 68 BPM | OXYGEN SATURATION: 98 % | BODY MASS INDEX: 34.16 KG/M2 | DIASTOLIC BLOOD PRESSURE: 95 MMHG | WEIGHT: 244 LBS | SYSTOLIC BLOOD PRESSURE: 131 MMHG | HEIGHT: 71 IN

## 2024-09-11 DIAGNOSIS — E89.0 POSTPROCEDURAL HYPOTHYROIDISM: ICD-10-CM

## 2024-09-11 DIAGNOSIS — R79.89 OTHER SPECIFIED ABNORMAL FINDINGS OF BLOOD CHEMISTRY: ICD-10-CM

## 2024-09-11 DIAGNOSIS — I10 ESSENTIAL (PRIMARY) HYPERTENSION: ICD-10-CM

## 2024-09-11 PROCEDURE — 99214 OFFICE O/P EST MOD 30 MIN: CPT

## 2024-09-11 NOTE — REVIEW OF SYSTEMS
[Erectile Dysfunction] : erectile dysfunction [Headaches] : headaches [All other systems negative] : All other systems negative [Fatigue] : no fatigue [Recent Weight Gain (___ Lbs)] : no recent weight gain [Recent Weight Loss (___ Lbs)] : no recent weight loss [Visual Field Defect] : no visual field defect [Dysphagia] : no dysphagia [Dysphonia] : no dysphonia [Chest Pain] : no chest pain [Palpitations] : no palpitations [Shortness Of Breath] : no shortness of breath [Nausea] : no nausea [Constipation] : no constipation [Vomiting] : no vomiting [Diarrhea] : no diarrhea [Polyuria] : no polyuria [Joint Pain] : no joint pain [Acanthosis] : no acanthosis  [Depression] : no depression [Polydipsia] : no polydipsia [Cold Intolerance] : no cold intolerance [Heat Intolerance] : no heat intolerance [de-identified] : +occasional migraines

## 2024-09-11 NOTE — HISTORY OF PRESENT ILLNESS
[FreeTextEntry1] : 48 y/o M referred for low testosterone 4/2024. Testosterone level checked by PCP for symptoms of difficulty maintaining erections and premature ejaculation. No decrease in libido. Normal energy level. No depression.  Testosterone level checked on multiple occasions in the morning with levels <300 with normal SHBG and low Free Testosterone.  Seen initially by me 4/2024. Recommended trial of Clomid 25mg every other day with improvement in testosterone to almost 600 but no improvement in erections.  Has noticed increase in acne since starting and some weight gain.  Has daughter born in 2012. No fertility assistance needed. On Symbicort. No other steroids. No anabolic steroid use. Has CPAP for sleep apnea. Reports adherence to CPAP.  +migraines a few times a year. No vision changes. No testicular changes No changes to shoe, hat, or hand size. No diabetes, No striae. No hx of easy bruising or fractures.   Hx of hypothyroidism on 137 mcg daily with adherence. Chemically euthyroid as of 6/2024.   HTN on ramipril

## 2024-09-11 NOTE — ASSESSMENT
[FreeTextEntry1] : 48 y/o M w/  1. Low Testosterone-- confirmed on multiple tests performed in the morning with associated ow Free Testosterone and normal range SHBG. Hypogonadism hypogonadotropic with inappropriately normal LH and FSH level. No evidence of hyperprolactinemia or other pituitary deficiencies. Now on Clomid 25mg qod with improvement in testosterone to almost 600 without improvement in symptoms especially ED. Consider symptomatic treatment with Viagra or Cialis as main concern is erectile dysfunction without any other associated symptoms of hypogonadism at this time. Would ideally try to keep testosterone level > 300 for overall bone health as well.   2. Hypothyroidism- clinically and chemically euthyroid/. Continue with levothyroxine 137 mcg daily. Check TFTs.  3. HTN- c/w ramipril

## 2024-09-11 NOTE — PHYSICAL EXAM
[Alert] : alert [Well Nourished] : well nourished [Healthy Appearance] : healthy appearance [No Acute Distress] : no acute distress [Well Developed] : well developed [EOMI] : extra ocular movement intact [Normal Hearing] : hearing was normal [Supple] : the neck was supple [Thyroid Not Enlarged] : the thyroid was not enlarged [No Respiratory Distress] : no respiratory distress [No Accessory Muscle Use] : no accessory muscle use [Normal Rate and Effort] : normal respiratory rate and effort [Clear to Auscultation] : lungs were clear to auscultation bilaterally [Normal S1, S2] : normal S1 and S2 [Normal Rate] : heart rate was normal [Regular Rhythm] : with a regular rhythm [No Edema] : no peripheral edema [No Galactorrhea] : no galactorrhea [Normal Bowel Sounds] : normal bowel sounds [Not Tender] : non-tender [Not Distended] : not distended [Soft] : abdomen soft [Penis Abnormality] : the penis was normal [Testes Swelling] : the testicles were not swollen [Scrotum] : the scrotum was normal [Testes Mass (___cm)] : there were no testicular masses [Normal Testicular Size] : normal testicular size [Normal Pattern Pubic Hair] : normal male pattern pubic hair [No Stigmata of Cushings Syndrome] : no stigmata of Cushings Syndrome [No Clubbing, Cyanosis] : no clubbing  or cyanosis of the fingernails [No Involuntary Movements] : no involuntary movements were seen [Normal Strength/Tone] : muscle strength and tone were normal [Oriented x3] : oriented to person, place, and time [Normal Affect] : the affect was normal [Normal Mood] : the mood was normal Epidermal Autograft Text: The defect edges were debeveled with a #15 scalpel blade.  Given the location of the defect, shape of the defect and the proximity to free margins an epidermal autograft was deemed most appropriate.  Using a sterile surgical marker, the primary defect shape was transferred to the donor site. The epidermal graft was then harvested.  The skin graft was then placed in the primary defect and oriented appropriately.

## 2024-09-12 LAB
ALBUMIN SERPL ELPH-MCNC: 4.7 G/DL
ALP BLD-CCNC: 62 U/L
ALT SERPL-CCNC: 19 U/L
ANION GAP SERPL CALC-SCNC: 14 MMOL/L
AST SERPL-CCNC: 20 U/L
BASOPHILS # BLD AUTO: 0.05 K/UL
BASOPHILS NFR BLD AUTO: 0.6 %
BILIRUB SERPL-MCNC: 0.3 MG/DL
BUN SERPL-MCNC: 17 MG/DL
CALCIUM SERPL-MCNC: 9.7 MG/DL
CHLORIDE SERPL-SCNC: 103 MMOL/L
CHOLEST SERPL-MCNC: 206 MG/DL
CO2 SERPL-SCNC: 24 MMOL/L
CREAT SERPL-MCNC: 1.24 MG/DL
EGFR: 72 ML/MIN/1.73M2
EOSINOPHIL # BLD AUTO: 0.11 K/UL
EOSINOPHIL NFR BLD AUTO: 1.4 %
ESTIMATED AVERAGE GLUCOSE: 111 MG/DL
ESTRADIOL SERPL-MCNC: 38 PG/ML
GLUCOSE SERPL-MCNC: 83 MG/DL
HBA1C MFR BLD HPLC: 5.5 %
HCT VFR BLD CALC: 53.9 %
HDLC SERPL-MCNC: 58 MG/DL
HGB BLD-MCNC: 16.5 G/DL
IMM GRANULOCYTES NFR BLD AUTO: 0.3 %
LDLC SERPL CALC-MCNC: 110 MG/DL
LYMPHOCYTES # BLD AUTO: 2.29 K/UL
LYMPHOCYTES NFR BLD AUTO: 29.3 %
MAN DIFF?: NORMAL
MCHC RBC-ENTMCNC: 29.2 PG
MCHC RBC-ENTMCNC: 30.6 GM/DL
MCV RBC AUTO: 95.2 FL
MONOCYTES # BLD AUTO: 0.62 K/UL
MONOCYTES NFR BLD AUTO: 7.9 %
NEUTROPHILS # BLD AUTO: 4.73 K/UL
NEUTROPHILS NFR BLD AUTO: 60.5 %
NONHDLC SERPL-MCNC: 147 MG/DL
PLATELET # BLD AUTO: 233 K/UL
POTASSIUM SERPL-SCNC: 4.4 MMOL/L
PROT SERPL-MCNC: 7.5 G/DL
RBC # BLD: 5.66 M/UL
RBC # FLD: 13.9 %
SODIUM SERPL-SCNC: 141 MMOL/L
T3 SERPL-MCNC: 99 NG/DL
T4 FREE SERPL-MCNC: 1.4 NG/DL
TESTOST SERPL-MCNC: 465 NG/DL
TRIGL SERPL-MCNC: 218 MG/DL
TSH SERPL-ACNC: 1.34 UIU/ML
WBC # FLD AUTO: 7.82 K/UL

## 2024-09-16 ENCOUNTER — APPOINTMENT (OUTPATIENT)
Dept: UROLOGY | Facility: CLINIC | Age: 49
End: 2024-09-16
Payer: COMMERCIAL

## 2024-09-16 DIAGNOSIS — N20.0 CALCULUS OF KIDNEY: ICD-10-CM

## 2024-09-16 DIAGNOSIS — N52.9 MALE ERECTILE DYSFUNCTION, UNSPECIFIED: ICD-10-CM

## 2024-09-16 PROCEDURE — 99214 OFFICE O/P EST MOD 30 MIN: CPT

## 2024-09-16 NOTE — REVIEW OF SYSTEMS
[see HPI] : see HPI [Cough] : cough [Poor quality erections] : Poor quality erections [Wake up at night to urinate  How many times?  ___] : wakes up to urinate [unfilled] times during the night [Negative] : Genitourinary

## 2024-09-16 NOTE — HISTORY OF PRESENT ILLNESS
[FreeTextEntry1] : Mr. PAREDES is a 48 year-old White  M who comes today to clinic referred from ED after presenting intense left flank pain for 1days CT identifying a 3 mm left ureteral stone at the UVJ. No history of stones in the past.  Patient was able to pass the stone that night and brings it to the day for analysis. Endorsing erectile dysfunction. His erections are not modified with the degree of sexual stimulation. He states that his erections presently are often less than 3 out of 10 in both tumescence and rigidity, insufficient for penetration. He does not ejaculate through a flaccid phallus. He has difficulty maintaining an erection. His sexual dysfunction occurs with both sexual relations and masturbation. He describes a diminished libido. Has never tried any medication in the past for this condition.  9/16/2024 Denies flank pain or passage of stone.  Was on Clomid for low testosterone, testosterone low normal. He will try tadalafil 10 mg as needed for erectile dysfunction. Stone analysis: Calcium oxalate. History of prostate cancer in his paternal side.  Father diagnosed with prostate cancer at age 45.  PSA is 0.89 January 26, 2024. Denies fevers, chills, hematuria, AUR.

## 2024-09-16 NOTE — ASSESSMENT
[FreeTextEntry1] : Mr. PAREDES is a 48 year-old White  M with history of kidney stone x 1, Stone analysis: Calcium oxalate. History of low testosterone corrected with Clomid. He will try tadalafil 10 mg as needed for erectile dysfunction. Father diagnosed with prostate cancer at age 45.  PSA is 0.89 January 26, 2024.  Follow-up with imaging in 1 year.  Today we discussed that normal male sexual function requires interactions among vascular, neurologic, hormonal, and psychological systems. The initial obligatory event required for male sexual activity, the acquisition and maintenance of penile erection, is primarily a vascular phenomenon, triggered by neurologic signals and facilitated only in the presence of an appropriate hormonal milieu and psychological mindset. The patient understands that there is a strong association between sexual function, aging, and male health. For these reasons the patient has agreed to rule out endocrine disorders such as hypogonadism and metabolic disorders such as diabetes/prediabetes.  As to drugs as cause of ED, Eight of the 12 most commonly prescribed medications list ED as a side effect, and it is estimated that 25 percent of cases of ED are due to medications.   --Antidepressants - Most antidepressants, in particular, selective serotonin reuptake inhibitors (SSRIs), are associated with ED.   --Antihypertensives - Some antihypertensives have been associated with ED. Antihypertensive drugs that are the least likely to be associated with ED are angiotensin-receptor blockers, angiotensin-converting enzyme inhibitors, and calcium channel blockers.  --Anti-androgens - Androgen deprivation therapy (ADT), which lowers serum testosterone levels to castrate levels, is an integral component of the systemic treatment of castration-sensitive metastatic prostate cancer and of some patients with high-risk localized prostate cancer.   --Other - Other drugs that have been associated with ED include spironolactone, sympathetic blockers (clonidine, guanethidine, and methyldopa), ketoconazole, and cimetidine

## 2024-09-16 NOTE — SIGNATURES
[TextEntry] : Prabhu Gentile M.D. Minimally Invasive and Robotic Urologic Surgery [facility] | [facility]  of [facility] [employer] at [facility] Tel: [phone] | Fax: [phone] | email: [email or web address]

## 2024-09-20 ENCOUNTER — APPOINTMENT (OUTPATIENT)
Dept: SURGERY | Facility: CLINIC | Age: 49
End: 2024-09-20
Payer: COMMERCIAL

## 2024-09-20 VITALS
DIASTOLIC BLOOD PRESSURE: 85 MMHG | WEIGHT: 240 LBS | BODY MASS INDEX: 33.6 KG/M2 | RESPIRATION RATE: 17 BRPM | OXYGEN SATURATION: 98 % | SYSTOLIC BLOOD PRESSURE: 125 MMHG | TEMPERATURE: 97.1 F | HEIGHT: 71 IN | HEART RATE: 75 BPM

## 2024-09-20 DIAGNOSIS — Z78.9 OTHER SPECIFIED HEALTH STATUS: ICD-10-CM

## 2024-09-20 DIAGNOSIS — K42.9 UMBILICAL HERNIA W/OUT OBSTRUCTION OR GANGRENE: ICD-10-CM

## 2024-09-20 PROCEDURE — 99203 OFFICE O/P NEW LOW 30 MIN: CPT

## 2024-09-20 NOTE — CONSULT LETTER
[Dear  ___] : Dear  [unfilled], [Consult Letter:] : I had the pleasure of evaluating your patient, [unfilled]. [Please see my note below.] : Please see my note below. [Consult Closing:] : Thank you very much for allowing me to participate in the care of this patient.  If you have any questions, please do not hesitate to contact me. [Sincerely,] : Sincerely, [FreeTextEntry3] : Ray Soni M.D., F.A.C.S, F.A.S.C.R.S

## 2024-09-20 NOTE — HISTORY OF PRESENT ILLNESS
[de-identified] : Rigoberto is a 50 y/o male here for a consultation visit, possible hernia.   Today pt reports no pain. Normal BMs, denies constipation or diarrhea. Denies nausea and vomiting. Good appetite. Not taking any anticoagulants. Does see a bulge of umblicisu for a year, does notice increase in size of bulge, denies hearing gurgling noises, and has discomfort from area with pressure.

## 2024-09-20 NOTE — HISTORY OF PRESENT ILLNESS
[de-identified] : Rigoberto is a 50 y/o male here for a consultation visit, possible hernia.   Today pt reports no pain. Normal BMs, denies constipation or diarrhea. Denies nausea and vomiting. Good appetite. Not taking any anticoagulants. Does see a bulge of umblicisu for a year, does notice increase in size of bulge, denies hearing gurgling noises, and has discomfort from area with pressure.

## 2024-09-20 NOTE — ASSESSMENT
[FreeTextEntry1] : In summary the patient has a mildly symptomatic chronically incarcerated fat-containing umbilical hernia.  I recommended that this be electively repaired with mesh.  I explained the risks benefits and alternatives including the risk of bleeding infection and recurrence.

## 2024-09-20 NOTE — PHYSICAL EXAM
[Normal Breath Sounds] : Normal breath sounds [Normal Heart Sounds] : normal heart sounds [Alert] : alert [Oriented to Person] : oriented to person [Oriented to Place] : oriented to place [Oriented to Time] : oriented to time [Calm] : calm [de-identified] : WNL [de-identified] : WNL [de-identified] : PORTIAL [de-identified] : Soft, not nontender chronically incarcerated nontender fat-containing umbilical hernia.  No palpable inguinal or femoral hernias bilaterally. [de-identified] : WNL ROM [de-identified] : WNL

## 2024-09-20 NOTE — PHYSICAL EXAM
[Normal Breath Sounds] : Normal breath sounds [Normal Heart Sounds] : normal heart sounds [Alert] : alert [Oriented to Person] : oriented to person [Oriented to Place] : oriented to place [Oriented to Time] : oriented to time [Calm] : calm [de-identified] : WNL [de-identified] : WNL [de-identified] : PORTIAL [de-identified] : Soft, not nontender chronically incarcerated nontender fat-containing umbilical hernia.  No palpable inguinal or femoral hernias bilaterally. [de-identified] : WNL ROM [de-identified] : WNL

## 2024-09-24 ENCOUNTER — APPOINTMENT (OUTPATIENT)
Dept: ULTRASOUND IMAGING | Facility: CLINIC | Age: 49
End: 2024-09-24
Payer: COMMERCIAL

## 2024-09-24 ENCOUNTER — OUTPATIENT (OUTPATIENT)
Dept: OUTPATIENT SERVICES | Facility: HOSPITAL | Age: 49
LOS: 1 days | End: 2024-09-24
Payer: COMMERCIAL

## 2024-09-24 DIAGNOSIS — N20.1 CALCULUS OF URETER: ICD-10-CM

## 2024-09-24 PROCEDURE — 76770 US EXAM ABDO BACK WALL COMP: CPT | Mod: 26

## 2024-09-24 PROCEDURE — 76770 US EXAM ABDO BACK WALL COMP: CPT

## 2024-12-23 ENCOUNTER — OUTPATIENT (OUTPATIENT)
Dept: OUTPATIENT SERVICES | Facility: HOSPITAL | Age: 49
LOS: 1 days | End: 2024-12-23
Payer: COMMERCIAL

## 2024-12-23 VITALS
SYSTOLIC BLOOD PRESSURE: 139 MMHG | DIASTOLIC BLOOD PRESSURE: 89 MMHG | WEIGHT: 242.07 LBS | HEART RATE: 86 BPM | HEIGHT: 71 IN | TEMPERATURE: 98 F | RESPIRATION RATE: 14 BRPM | OXYGEN SATURATION: 97 %

## 2024-12-23 DIAGNOSIS — Z90.89 ACQUIRED ABSENCE OF OTHER ORGANS: Chronic | ICD-10-CM

## 2024-12-23 DIAGNOSIS — Z98.890 OTHER SPECIFIED POSTPROCEDURAL STATES: Chronic | ICD-10-CM

## 2024-12-23 DIAGNOSIS — K42.9 UMBILICAL HERNIA WITHOUT OBSTRUCTION OR GANGRENE: ICD-10-CM

## 2024-12-23 DIAGNOSIS — G47.33 OBSTRUCTIVE SLEEP APNEA (ADULT) (PEDIATRIC): ICD-10-CM

## 2024-12-23 DIAGNOSIS — Z01.818 ENCOUNTER FOR OTHER PREPROCEDURAL EXAMINATION: ICD-10-CM

## 2024-12-23 LAB
ANION GAP SERPL CALC-SCNC: 13 MMOL/L — SIGNIFICANT CHANGE UP (ref 5–17)
BUN SERPL-MCNC: 17 MG/DL — SIGNIFICANT CHANGE UP (ref 7–23)
CALCIUM SERPL-MCNC: 9.7 MG/DL — SIGNIFICANT CHANGE UP (ref 8.4–10.5)
CHLORIDE SERPL-SCNC: 103 MMOL/L — SIGNIFICANT CHANGE UP (ref 96–108)
CO2 SERPL-SCNC: 24 MMOL/L — SIGNIFICANT CHANGE UP (ref 22–31)
CREAT SERPL-MCNC: 1.24 MG/DL — SIGNIFICANT CHANGE UP (ref 0.5–1.3)
EGFR: 72 ML/MIN/1.73M2 — SIGNIFICANT CHANGE UP
GLUCOSE SERPL-MCNC: 93 MG/DL — SIGNIFICANT CHANGE UP (ref 70–99)
HCT VFR BLD CALC: 51.4 % — HIGH (ref 39–50)
HGB BLD-MCNC: 16.8 G/DL — SIGNIFICANT CHANGE UP (ref 13–17)
MCHC RBC-ENTMCNC: 29.1 PG — SIGNIFICANT CHANGE UP (ref 27–34)
MCHC RBC-ENTMCNC: 32.7 G/DL — SIGNIFICANT CHANGE UP (ref 32–36)
MCV RBC AUTO: 89.1 FL — SIGNIFICANT CHANGE UP (ref 80–100)
MRSA PCR RESULT.: SIGNIFICANT CHANGE UP
NRBC # BLD: 0 /100 WBCS — SIGNIFICANT CHANGE UP (ref 0–0)
PLATELET # BLD AUTO: 266 K/UL — SIGNIFICANT CHANGE UP (ref 150–400)
POTASSIUM SERPL-MCNC: 4.2 MMOL/L — SIGNIFICANT CHANGE UP (ref 3.5–5.3)
POTASSIUM SERPL-SCNC: 4.2 MMOL/L — SIGNIFICANT CHANGE UP (ref 3.5–5.3)
RBC # BLD: 5.77 M/UL — SIGNIFICANT CHANGE UP (ref 4.2–5.8)
RBC # FLD: 13.3 % — SIGNIFICANT CHANGE UP (ref 10.3–14.5)
S AUREUS DNA NOSE QL NAA+PROBE: SIGNIFICANT CHANGE UP
SODIUM SERPL-SCNC: 140 MMOL/L — SIGNIFICANT CHANGE UP (ref 135–145)
WBC # BLD: 6.04 K/UL — SIGNIFICANT CHANGE UP (ref 3.8–10.5)
WBC # FLD AUTO: 6.04 K/UL — SIGNIFICANT CHANGE UP (ref 3.8–10.5)

## 2024-12-23 PROCEDURE — 87640 STAPH A DNA AMP PROBE: CPT

## 2024-12-23 PROCEDURE — 87641 MR-STAPH DNA AMP PROBE: CPT

## 2024-12-23 PROCEDURE — 85027 COMPLETE CBC AUTOMATED: CPT

## 2024-12-23 PROCEDURE — G0463: CPT

## 2024-12-23 PROCEDURE — 80048 BASIC METABOLIC PNL TOTAL CA: CPT

## 2024-12-23 NOTE — H&P PST ADULT - HISTORY OF PRESENT ILLNESS
49 year old male with PMHx of hypothyroidism, HTN, asthma () presents to PST for umbilical hernia repair on 1/9/25. Patient denies fever, chills, SOB, chest pain.  49 year old male with PMHx of hypothyroidism, HTN, asthma (never hospitalized/intubated, uses rescue inhaler almost never, well controlled per patient), ISMAEL on CPAP presents to PST for umbilical hernia repair on 1/9/25. Patient denies fever, chills, SOB, chest pain.

## 2024-12-23 NOTE — H&P PST ADULT - ASSESSMENT
Denies loose/cracked teeth, dentures/bridges  Mallampati  Denies loose/cracked teeth, dentures/bridges  Mallampati I

## 2024-12-23 NOTE — H&P PST ADULT - NSANTHOSAYNRD_GEN_A_CORE
No. ISMAEL screening performed.  STOP BANG Legend: 0-2 = LOW Risk; 3-4 = INTERMEDIATE Risk; 5-8 = HIGH Risk Yes

## 2024-12-23 NOTE — H&P PST ADULT - NSICDXPROCEDURE_GEN_ALL_CORE_FT
PROCEDURES:  Repair, hernia, inguinal, laparoscopic, with umbilical hernia repair 23-Dec-2024 09:59:55  Danay Sandhu

## 2024-12-23 NOTE — H&P PST ADULT - PROBLEM SELECTOR PLAN 1
Planned for umbilical hernia repair on 1/9/25. Surgical and Chlorhexidine instructions reviewed and provided to patient. CBC, BMP and MRSA obtained at PST.

## 2024-12-23 NOTE — H&P PST ADULT - NSICDXPASTMEDICALHX_GEN_ALL_CORE_FT
PAST MEDICAL HISTORY:  Asthma     HTN (hypertension)     Hypothyroidism     Umbilical hernia      PAST MEDICAL HISTORY:  Asthma     HTN (hypertension)     Hypothyroidism     ISMAEL on CPAP     Umbilical hernia

## 2024-12-24 PROBLEM — F10.90 ALCOHOL USE: Status: ACTIVE | Noted: 2017-09-29

## 2025-01-09 ENCOUNTER — TRANSCRIPTION ENCOUNTER (OUTPATIENT)
Age: 50
End: 2025-01-09

## 2025-01-09 ENCOUNTER — APPOINTMENT (OUTPATIENT)
Dept: SURGERY | Facility: HOSPITAL | Age: 50
End: 2025-01-09
Payer: COMMERCIAL

## 2025-01-09 ENCOUNTER — OUTPATIENT (OUTPATIENT)
Dept: OUTPATIENT SERVICES | Facility: HOSPITAL | Age: 50
LOS: 1 days | End: 2025-01-09
Payer: COMMERCIAL

## 2025-01-09 ENCOUNTER — RESULT REVIEW (OUTPATIENT)
Age: 50
End: 2025-01-09

## 2025-01-09 VITALS
DIASTOLIC BLOOD PRESSURE: 83 MMHG | SYSTOLIC BLOOD PRESSURE: 136 MMHG | OXYGEN SATURATION: 95 % | RESPIRATION RATE: 12 BRPM | HEART RATE: 85 BPM

## 2025-01-09 VITALS
HEART RATE: 89 BPM | DIASTOLIC BLOOD PRESSURE: 82 MMHG | OXYGEN SATURATION: 97 % | TEMPERATURE: 98 F | WEIGHT: 242.07 LBS | HEIGHT: 71 IN | RESPIRATION RATE: 16 BRPM | SYSTOLIC BLOOD PRESSURE: 128 MMHG

## 2025-01-09 DIAGNOSIS — Z98.890 OTHER SPECIFIED POSTPROCEDURAL STATES: Chronic | ICD-10-CM

## 2025-01-09 DIAGNOSIS — K42.9 UMBILICAL HERNIA WITHOUT OBSTRUCTION OR GANGRENE: ICD-10-CM

## 2025-01-09 DIAGNOSIS — Z90.89 ACQUIRED ABSENCE OF OTHER ORGANS: Chronic | ICD-10-CM

## 2025-01-09 PROCEDURE — 49591 RPR AA HRN 1ST < 3 CM RDC: CPT

## 2025-01-09 PROCEDURE — 88302 TISSUE EXAM BY PATHOLOGIST: CPT

## 2025-01-09 PROCEDURE — 49592 RPR AA HRN 1ST < 3 NCR/STRN: CPT

## 2025-01-09 PROCEDURE — C9399: CPT

## 2025-01-09 PROCEDURE — 88302 TISSUE EXAM BY PATHOLOGIST: CPT | Mod: 26

## 2025-01-09 PROCEDURE — C1781: CPT

## 2025-01-09 DEVICE — MESH HERNIA VENTRALEX ST SMALL 1.7": Type: IMPLANTABLE DEVICE | Status: FUNCTIONAL

## 2025-01-09 DEVICE — MESH HERNIA VENTRALEX SMALL 4.3CM: Type: IMPLANTABLE DEVICE | Status: FUNCTIONAL

## 2025-01-09 DEVICE — MESH HERNIA VENTRALEX ST LARGE 3.2": Type: IMPLANTABLE DEVICE | Status: FUNCTIONAL

## 2025-01-09 RX ORDER — APREPITANT 40 MG/1
40 CAPSULE ORAL ONCE
Refills: 0 | Status: ACTIVE | OUTPATIENT
Start: 2025-01-09

## 2025-01-09 RX ORDER — BUDESONIDE AND FORMOTEROL FUMARATE 160; 4.5 UG/1; UG/1
2 AEROSOL, METERED RESPIRATORY (INHALATION)
Refills: 0 | DISCHARGE

## 2025-01-09 RX ORDER — CHLORHEXIDINE GLUCONATE 1.2 MG/ML
1 RINSE ORAL ONCE
Refills: 0 | Status: COMPLETED | OUTPATIENT
Start: 2025-01-09 | End: 2025-01-09

## 2025-01-09 RX ORDER — HYDROMORPHONE HCL 4 MG
0.5 TABLET ORAL
Refills: 0 | Status: DISCONTINUED | OUTPATIENT
Start: 2025-01-09 | End: 2025-01-09

## 2025-01-09 RX ORDER — HYDROMORPHONE HCL 4 MG
1 TABLET ORAL
Refills: 0 | Status: DISCONTINUED | OUTPATIENT
Start: 2025-01-09 | End: 2025-01-09

## 2025-01-09 RX ORDER — RAMIPRIL 5 MG/1
1 CAPSULE ORAL
Refills: 0 | DISCHARGE

## 2025-01-09 RX ORDER — SODIUM CHLORIDE 9 MG/ML
1000 INJECTION, SOLUTION INTRAVENOUS
Refills: 0 | Status: ACTIVE | OUTPATIENT
Start: 2025-01-09 | End: 2025-12-08

## 2025-01-09 RX ORDER — LIDOCAINE HYDROCHLORIDE 10 MG/ML
0.2 INJECTION INFILTRATION; PERINEURAL ONCE
Refills: 0 | Status: ACTIVE | OUTPATIENT
Start: 2025-01-09

## 2025-01-09 RX ORDER — ONDANSETRON 4 MG/1
4 TABLET ORAL ONCE
Refills: 0 | Status: COMPLETED | OUTPATIENT
Start: 2025-01-09 | End: 2025-01-09

## 2025-01-09 RX ORDER — CEFAZOLIN SODIUM 1 G
2000 VIAL (EA) INJECTION ONCE
Refills: 0 | Status: COMPLETED | OUTPATIENT
Start: 2025-01-09 | End: 2025-01-09

## 2025-01-09 RX ORDER — LEVOTHYROXINE SODIUM 175 UG/1
1 TABLET ORAL
Refills: 0 | DISCHARGE

## 2025-01-09 RX ORDER — OXYCODONE HCL 15 MG
1 TABLET ORAL
Qty: 12 | Refills: 0
Start: 2025-01-09 | End: 2025-01-11

## 2025-01-09 RX ADMIN — SODIUM CHLORIDE 100 MILLILITER(S): 9 INJECTION, SOLUTION INTRAVENOUS at 06:17

## 2025-01-09 RX ADMIN — ONDANSETRON 4 MILLIGRAM(S): 4 TABLET ORAL at 08:43

## 2025-01-09 RX ADMIN — CHLORHEXIDINE GLUCONATE 1 APPLICATION(S): 1.2 RINSE ORAL at 06:17

## 2025-01-09 NOTE — ASU PATIENT PROFILE, ADULT - FALL HARM RISK - UNIVERSAL INTERVENTIONS
Bed in lowest position, wheels locked, appropriate side rails in place/Call bell, personal items and telephone in reach/Instruct patient to call for assistance before getting out of bed or chair/Non-slip footwear when patient is out of bed/[place] to call system/Physically safe environment - no spills, clutter or unnecessary equipment/Purposeful Proactive Rounding/Room/bathroom lighting operational, light cord in reach

## 2025-01-09 NOTE — ASU DISCHARGE PLAN (ADULT/PEDIATRIC) - DO NOT TAKE THE STERI STRIPS OFF
Plan: Tc azelaic acid Discontinue Regimen: Winlevi 1% topical cream: Apply thin layer to face BID Samples Given: Cerave Salicylic Acid Cleanser Detail Level: Zone Render In Strict Bullet Format?: No Modify Regimen: Increase Retin-A 0.025% to 0.05% topical cream: Apply a pea size amount to the face QHS, start BIW-TIW, increase nights as tolerated Continue Regimen: spironolactone 100mg tablet: Take one tablet qhs Initiate Treatment: OTC Salicylic Acid Cleanser Statement Selected

## 2025-01-09 NOTE — ASU PATIENT PROFILE, ADULT - NSICDXPASTMEDICALHX_GEN_ALL_CORE_FT
PAST MEDICAL HISTORY:  Asthma     HTN (hypertension)     Hypothyroidism     ISMAEL on CPAP     Umbilical hernia

## 2025-01-09 NOTE — ASU DISCHARGE PLAN (ADULT/PEDIATRIC) - ASU DC SPECIAL INSTRUCTIONSFT
Pain management and post-op instructions about your surgery:    Extra strength Tylenol 500mg 2 tabs then Ibuprofen 600mg every 3 hours alternating  Oxycodone 5mg every 6 hours as needed for breakthrough pain  You may shower. Do not scrub incision. Pat Dry abdomen. Leave the white steri strips in place, they will fall off on their own in approximately 5-7 days.     Follow up with Dr. Soni in 2 weeks.  You may call the office to schedule an appointment.

## 2025-01-09 NOTE — ASU DISCHARGE PLAN (ADULT/PEDIATRIC) - FINANCIAL ASSISTANCE
[facility] provides services at a reduced cost to those who are determined to be eligible through [facility]’s financial assistance program. Information regarding [facility]’s financial assistance program can be found by going to [email or web address] or by calling [phone].

## 2025-01-09 NOTE — ASU DISCHARGE PLAN (ADULT/PEDIATRIC) - CARE PROVIDER_API CALL
Ray Soni  Colon/Rectal Surgery  [address] [facility], [facility] [address]  [place] [address]  Phone: [phone]  Fax: [phone]  Follow Up Time:

## 2025-01-10 PROBLEM — J45.909 UNSPECIFIED ASTHMA, UNCOMPLICATED: Chronic | Status: ACTIVE | Noted: 2024-12-23

## 2025-01-10 PROBLEM — K42.9 UMBILICAL HERNIA WITHOUT OBSTRUCTION OR GANGRENE: Chronic | Status: ACTIVE | Noted: 2024-12-23

## 2025-01-10 PROBLEM — G47.33 OBSTRUCTIVE SLEEP APNEA (ADULT) (PEDIATRIC): Chronic | Status: ACTIVE | Noted: 2024-12-23

## 2025-01-10 PROBLEM — I10 ESSENTIAL (PRIMARY) HYPERTENSION: Chronic | Status: ACTIVE | Noted: 2024-12-23

## 2025-01-10 PROBLEM — E03.9 HYPOTHYROIDISM, UNSPECIFIED: Chronic | Status: ACTIVE | Noted: 2024-12-23

## 2025-01-14 LAB — SURGICAL PATHOLOGY STUDY: SIGNIFICANT CHANGE UP

## 2025-01-16 PROBLEM — Z09 POSTOPERATIVE EXAMINATION: Status: ACTIVE | Noted: 2025-01-16

## 2025-01-24 ENCOUNTER — APPOINTMENT (OUTPATIENT)
Dept: SURGERY | Facility: CLINIC | Age: 50
End: 2025-01-24
Payer: COMMERCIAL

## 2025-01-24 ENCOUNTER — NON-APPOINTMENT (OUTPATIENT)
Age: 50
End: 2025-01-24

## 2025-01-24 VITALS
WEIGHT: 240 LBS | BODY MASS INDEX: 33.6 KG/M2 | TEMPERATURE: 95.1 F | RESPIRATION RATE: 18 BRPM | HEIGHT: 71 IN | OXYGEN SATURATION: 98 %

## 2025-01-24 PROCEDURE — 99212 OFFICE O/P EST SF 10 MIN: CPT

## 2025-01-27 ENCOUNTER — APPOINTMENT (OUTPATIENT)
Dept: INTERNAL MEDICINE | Facility: CLINIC | Age: 50
End: 2025-01-27
Payer: COMMERCIAL

## 2025-01-27 VITALS
DIASTOLIC BLOOD PRESSURE: 88 MMHG | HEART RATE: 88 BPM | TEMPERATURE: 98.4 F | SYSTOLIC BLOOD PRESSURE: 145 MMHG | OXYGEN SATURATION: 98 %

## 2025-01-27 VITALS — DIASTOLIC BLOOD PRESSURE: 90 MMHG | SYSTOLIC BLOOD PRESSURE: 140 MMHG

## 2025-01-27 DIAGNOSIS — J45.20 MILD INTERMITTENT ASTHMA, UNCOMPLICATED: ICD-10-CM

## 2025-01-27 DIAGNOSIS — Z87.19 PERSONAL HISTORY OF OTHER DISEASES OF THE DIGESTIVE SYSTEM: ICD-10-CM

## 2025-01-27 DIAGNOSIS — R05.9 COUGH, UNSPECIFIED: ICD-10-CM

## 2025-01-27 DIAGNOSIS — N52.9 MALE ERECTILE DYSFUNCTION, UNSPECIFIED: ICD-10-CM

## 2025-01-27 DIAGNOSIS — Z12.11 ENCOUNTER FOR SCREENING FOR MALIGNANT NEOPLASM OF COLON: ICD-10-CM

## 2025-01-27 DIAGNOSIS — N20.1 CALCULUS OF URETER: ICD-10-CM

## 2025-01-27 DIAGNOSIS — G47.33 OBSTRUCTIVE SLEEP APNEA (ADULT) (PEDIATRIC): ICD-10-CM

## 2025-01-27 DIAGNOSIS — Z00.00 ENCOUNTER FOR GENERAL ADULT MEDICAL EXAMINATION W/OUT ABNORMAL FINDINGS: ICD-10-CM

## 2025-01-27 DIAGNOSIS — R09.82 POSTNASAL DRIP: ICD-10-CM

## 2025-01-27 DIAGNOSIS — E55.9 VITAMIN D DEFICIENCY, UNSPECIFIED: ICD-10-CM

## 2025-01-27 DIAGNOSIS — E66.9 OBESITY, UNSPECIFIED: ICD-10-CM

## 2025-01-27 DIAGNOSIS — Z09 ENCOUNTER FOR FOLLOW-UP EXAMINATION AFTER COMPLETED TREATMENT FOR CONDITIONS OTHER THAN MALIGNANT NEOPLASM: ICD-10-CM

## 2025-01-27 DIAGNOSIS — E89.0 POSTPROCEDURAL HYPOTHYROIDISM: ICD-10-CM

## 2025-01-27 DIAGNOSIS — R79.89 OTHER SPECIFIED ABNORMAL FINDINGS OF BLOOD CHEMISTRY: ICD-10-CM

## 2025-01-27 DIAGNOSIS — Z12.83 ENCOUNTER FOR SCREENING FOR MALIGNANT NEOPLASM OF SKIN: ICD-10-CM

## 2025-01-27 DIAGNOSIS — I10 ESSENTIAL (PRIMARY) HYPERTENSION: ICD-10-CM

## 2025-01-27 PROCEDURE — 93000 ELECTROCARDIOGRAM COMPLETE: CPT

## 2025-01-27 PROCEDURE — 36415 COLL VENOUS BLD VENIPUNCTURE: CPT

## 2025-01-27 PROCEDURE — 99396 PREV VISIT EST AGE 40-64: CPT

## 2025-01-27 RX ORDER — FLUTICASONE PROPIONATE 50 UG/1
50 SPRAY, METERED NASAL
Qty: 1 | Refills: 6 | Status: ACTIVE | COMMUNITY
Start: 2025-01-27 | End: 1900-01-01

## 2025-01-27 RX ORDER — ESOMEPRAZOLE MAGNESIUM 20 MG/1
20 CAPSULE, DELAYED RELEASE ORAL
Qty: 30 | Refills: 0 | Status: ACTIVE | COMMUNITY
Start: 2025-01-27 | End: 1900-01-01

## 2025-01-29 ENCOUNTER — TRANSCRIPTION ENCOUNTER (OUTPATIENT)
Age: 50
End: 2025-01-29

## 2025-01-29 ENCOUNTER — NON-APPOINTMENT (OUTPATIENT)
Age: 50
End: 2025-01-29

## 2025-01-29 LAB
25(OH)D3 SERPL-MCNC: 37.7 NG/ML
ALBUMIN SERPL ELPH-MCNC: 4.4 G/DL
ALP BLD-CCNC: 81 U/L
ALT SERPL-CCNC: 24 U/L
ANION GAP SERPL CALC-SCNC: 14 MMOL/L
AST SERPL-CCNC: 21 U/L
BASOPHILS # BLD AUTO: 0.05 K/UL
BASOPHILS NFR BLD AUTO: 0.7 %
BILIRUB SERPL-MCNC: 0.6 MG/DL
BUN SERPL-MCNC: 17 MG/DL
CALCIUM SERPL-MCNC: 9.5 MG/DL
CHLORIDE SERPL-SCNC: 101 MMOL/L
CHOLEST SERPL-MCNC: 184 MG/DL
CO2 SERPL-SCNC: 26 MMOL/L
CREAT SERPL-MCNC: 1.28 MG/DL
EGFR: 69 ML/MIN/1.73M2
EOSINOPHIL # BLD AUTO: 0.1 K/UL
EOSINOPHIL NFR BLD AUTO: 1.4 %
ESTIMATED AVERAGE GLUCOSE: 117 MG/DL
GLUCOSE SERPL-MCNC: 96 MG/DL
HBA1C MFR BLD HPLC: 5.7 %
HCT VFR BLD CALC: 50.7 %
HDLC SERPL-MCNC: 53 MG/DL
HGB BLD-MCNC: 16.5 G/DL
IMM GRANULOCYTES NFR BLD AUTO: 0.1 %
LDLC SERPL CALC-MCNC: 84 MG/DL
LDLC SERPL DIRECT ASSAY-MCNC: 74 MG/DL
LYMPHOCYTES # BLD AUTO: 1.94 K/UL
LYMPHOCYTES NFR BLD AUTO: 27.7 %
MAN DIFF?: NORMAL
MCHC RBC-ENTMCNC: 30.2 PG
MCHC RBC-ENTMCNC: 32.5 G/DL
MCV RBC AUTO: 92.9 FL
MONOCYTES # BLD AUTO: 0.61 K/UL
MONOCYTES NFR BLD AUTO: 8.7 %
NEUTROPHILS # BLD AUTO: 4.29 K/UL
NEUTROPHILS NFR BLD AUTO: 61.4 %
NONHDLC SERPL-MCNC: 131 MG/DL
PLATELET # BLD AUTO: 255 K/UL
POTASSIUM SERPL-SCNC: 4.2 MMOL/L
PROT SERPL-MCNC: 7.6 G/DL
PSA SERPL-MCNC: 0.95 NG/ML
RBC # BLD: 5.46 M/UL
RBC # FLD: 13.1 %
SODIUM SERPL-SCNC: 141 MMOL/L
T4 FREE SERPL-MCNC: 1.6 NG/DL
TRIGL SERPL-MCNC: 286 MG/DL
TSH SERPL-ACNC: 1.88 UIU/ML
WBC # FLD AUTO: 7 K/UL

## 2025-03-27 ENCOUNTER — APPOINTMENT (OUTPATIENT)
Dept: PULMONOLOGY | Facility: CLINIC | Age: 50
End: 2025-03-27
Payer: COMMERCIAL

## 2025-03-27 DIAGNOSIS — G47.33 OBSTRUCTIVE SLEEP APNEA (ADULT) (PEDIATRIC): ICD-10-CM

## 2025-03-27 PROCEDURE — 99213 OFFICE O/P EST LOW 20 MIN: CPT | Mod: 95

## 2025-04-14 ENCOUNTER — TRANSCRIPTION ENCOUNTER (OUTPATIENT)
Age: 50
End: 2025-04-14

## 2025-04-24 ENCOUNTER — RX RENEWAL (OUTPATIENT)
Age: 50
End: 2025-04-24

## 2025-04-25 ENCOUNTER — RX RENEWAL (OUTPATIENT)
Age: 50
End: 2025-04-25

## 2025-07-09 ENCOUNTER — APPOINTMENT (OUTPATIENT)
Dept: INTERNAL MEDICINE | Facility: CLINIC | Age: 50
End: 2025-07-09
Payer: COMMERCIAL

## 2025-07-09 VITALS
BODY MASS INDEX: 33.32 KG/M2 | HEIGHT: 71 IN | HEART RATE: 82 BPM | SYSTOLIC BLOOD PRESSURE: 140 MMHG | DIASTOLIC BLOOD PRESSURE: 88 MMHG | OXYGEN SATURATION: 98 % | WEIGHT: 238 LBS

## 2025-07-09 VITALS — SYSTOLIC BLOOD PRESSURE: 130 MMHG | DIASTOLIC BLOOD PRESSURE: 96 MMHG

## 2025-07-09 PROBLEM — R73.02 IGT (IMPAIRED GLUCOSE TOLERANCE): Status: ACTIVE | Noted: 2025-07-09

## 2025-07-09 PROBLEM — E78.49 OTHER HYPERLIPIDEMIA: Status: ACTIVE | Noted: 2025-07-09

## 2025-07-09 PROCEDURE — G2211 COMPLEX E/M VISIT ADD ON: CPT | Mod: NC

## 2025-07-09 PROCEDURE — 99214 OFFICE O/P EST MOD 30 MIN: CPT

## 2025-07-09 PROCEDURE — 36415 COLL VENOUS BLD VENIPUNCTURE: CPT

## 2025-07-10 LAB
ALBUMIN SERPL ELPH-MCNC: 4.2 G/DL
ALP BLD-CCNC: 79 U/L
ALT SERPL-CCNC: 22 U/L
ANION GAP SERPL CALC-SCNC: 12 MMOL/L
AST SERPL-CCNC: 28 U/L
BILIRUB SERPL-MCNC: 0.3 MG/DL
BUN SERPL-MCNC: 13 MG/DL
CALCIUM SERPL-MCNC: 9.5 MG/DL
CHLORIDE SERPL-SCNC: 103 MMOL/L
CO2 SERPL-SCNC: 23 MMOL/L
CREAT SERPL-MCNC: 1.14 MG/DL
EGFRCR SERPLBLD CKD-EPI 2021: 79 ML/MIN/1.73M2
ESTIMATED AVERAGE GLUCOSE: 108 MG/DL
GLUCOSE SERPL-MCNC: 84 MG/DL
HBA1C MFR BLD HPLC: 5.4 %
POTASSIUM SERPL-SCNC: 4.1 MMOL/L
PROT SERPL-MCNC: 7.4 G/DL
SODIUM SERPL-SCNC: 139 MMOL/L
T4 FREE SERPL-MCNC: 1.4 NG/DL
TSH SERPL-ACNC: 0.92 UIU/ML

## 2025-08-13 ENCOUNTER — NON-APPOINTMENT (OUTPATIENT)
Age: 50
End: 2025-08-13

## 2025-08-14 ENCOUNTER — APPOINTMENT (OUTPATIENT)
Dept: NEUROSURGERY | Facility: CLINIC | Age: 50
End: 2025-08-14
Payer: COMMERCIAL

## 2025-08-14 ENCOUNTER — APPOINTMENT (OUTPATIENT)
Dept: NEUROSURGERY | Facility: CLINIC | Age: 50
End: 2025-08-14

## 2025-08-14 VITALS
OXYGEN SATURATION: 98 % | HEART RATE: 80 BPM | HEIGHT: 71 IN | BODY MASS INDEX: 31.5 KG/M2 | DIASTOLIC BLOOD PRESSURE: 83 MMHG | SYSTOLIC BLOOD PRESSURE: 121 MMHG | WEIGHT: 225 LBS | RESPIRATION RATE: 16 BRPM

## 2025-08-14 DIAGNOSIS — M54.2 CERVICALGIA: ICD-10-CM

## 2025-08-14 DIAGNOSIS — R26.9 UNSPECIFIED ABNORMALITIES OF GAIT AND MOBILITY: ICD-10-CM

## 2025-08-14 PROCEDURE — 99203 OFFICE O/P NEW LOW 30 MIN: CPT

## 2025-08-26 ENCOUNTER — APPOINTMENT (OUTPATIENT)
Dept: INTERNAL MEDICINE | Facility: CLINIC | Age: 50
End: 2025-08-26
Payer: COMMERCIAL

## 2025-08-26 VITALS
WEIGHT: 231 LBS | HEART RATE: 83 BPM | OXYGEN SATURATION: 97 % | BODY MASS INDEX: 32.34 KG/M2 | SYSTOLIC BLOOD PRESSURE: 130 MMHG | HEIGHT: 71 IN | DIASTOLIC BLOOD PRESSURE: 80 MMHG

## 2025-08-26 DIAGNOSIS — I10 ESSENTIAL (PRIMARY) HYPERTENSION: ICD-10-CM

## 2025-08-26 DIAGNOSIS — Z87.898 PERSONAL HISTORY OF OTHER SPECIFIED CONDITIONS: ICD-10-CM

## 2025-08-26 DIAGNOSIS — E78.49 OTHER HYPERLIPIDEMIA: ICD-10-CM

## 2025-08-26 DIAGNOSIS — E89.0 POSTPROCEDURAL HYPOTHYROIDISM: ICD-10-CM

## 2025-08-26 DIAGNOSIS — R26.89 OTHER ABNORMALITIES OF GAIT AND MOBILITY: ICD-10-CM

## 2025-08-26 DIAGNOSIS — E66.9 OBESITY, UNSPECIFIED: ICD-10-CM

## 2025-08-26 DIAGNOSIS — G47.33 OBSTRUCTIVE SLEEP APNEA (ADULT) (PEDIATRIC): ICD-10-CM

## 2025-08-26 PROCEDURE — 99214 OFFICE O/P EST MOD 30 MIN: CPT

## 2025-08-26 PROCEDURE — 36415 COLL VENOUS BLD VENIPUNCTURE: CPT

## 2025-08-27 ENCOUNTER — APPOINTMENT (OUTPATIENT)
Dept: MRI IMAGING | Facility: CLINIC | Age: 50
End: 2025-08-27
Payer: COMMERCIAL

## 2025-08-27 ENCOUNTER — OUTPATIENT (OUTPATIENT)
Dept: OUTPATIENT SERVICES | Facility: HOSPITAL | Age: 50
LOS: 1 days | End: 2025-08-27
Payer: COMMERCIAL

## 2025-08-27 DIAGNOSIS — R26.9 UNSPECIFIED ABNORMALITIES OF GAIT AND MOBILITY: ICD-10-CM

## 2025-08-27 DIAGNOSIS — Z98.890 OTHER SPECIFIED POSTPROCEDURAL STATES: Chronic | ICD-10-CM

## 2025-08-27 DIAGNOSIS — Z90.89 ACQUIRED ABSENCE OF OTHER ORGANS: Chronic | ICD-10-CM

## 2025-08-27 DIAGNOSIS — Z00.8 ENCOUNTER FOR OTHER GENERAL EXAMINATION: ICD-10-CM

## 2025-08-27 PROCEDURE — 70551 MRI BRAIN STEM W/O DYE: CPT | Mod: 26

## 2025-08-27 PROCEDURE — 70551 MRI BRAIN STEM W/O DYE: CPT

## 2025-08-27 PROCEDURE — 72141 MRI NECK SPINE W/O DYE: CPT

## 2025-08-27 PROCEDURE — 72141 MRI NECK SPINE W/O DYE: CPT | Mod: 26

## 2025-08-28 ENCOUNTER — NON-APPOINTMENT (OUTPATIENT)
Age: 50
End: 2025-08-28

## 2025-08-29 DIAGNOSIS — R79.89 OTHER SPECIFIED ABNORMAL FINDINGS OF BLOOD CHEMISTRY: ICD-10-CM

## 2025-08-29 LAB
ALBUMIN SERPL ELPH-MCNC: 4.5 G/DL
ALP BLD-CCNC: 80 U/L
ALT SERPL-CCNC: 18 U/L
ANION GAP SERPL CALC-SCNC: 12 MMOL/L
AST SERPL-CCNC: 28 U/L
BASOPHILS # BLD AUTO: 0.03 K/UL
BASOPHILS NFR BLD AUTO: 0.4 %
BILIRUB SERPL-MCNC: 0.5 MG/DL
BUN SERPL-MCNC: 16 MG/DL
CALCIUM SERPL-MCNC: 9.9 MG/DL
CHLORIDE SERPL-SCNC: 103 MMOL/L
CO2 SERPL-SCNC: 27 MMOL/L
CREAT SERPL-MCNC: 1.31 MG/DL
EGFRCR SERPLBLD CKD-EPI 2021: 67 ML/MIN/1.73M2
EOSINOPHIL # BLD AUTO: 0.12 K/UL
EOSINOPHIL NFR BLD AUTO: 1.6 %
ERYTHROCYTE [SEDIMENTATION RATE] IN BLOOD BY WESTERGREN METHOD: 5 MM/HR
GLUCOSE SERPL-MCNC: 96 MG/DL
HCT VFR BLD CALC: 50.1 %
HGB BLD-MCNC: 15.9 G/DL
IMM GRANULOCYTES NFR BLD AUTO: 0.4 %
LYMPHOCYTES # BLD AUTO: 2.02 K/UL
LYMPHOCYTES NFR BLD AUTO: 26.3 %
MAN DIFF?: NORMAL
MCHC RBC-ENTMCNC: 29.8 PG
MCHC RBC-ENTMCNC: 31.7 G/DL
MCV RBC AUTO: 93.8 FL
MONOCYTES # BLD AUTO: 0.59 K/UL
MONOCYTES NFR BLD AUTO: 7.7 %
NEUTROPHILS # BLD AUTO: 4.89 K/UL
NEUTROPHILS NFR BLD AUTO: 63.6 %
PLATELET # BLD AUTO: 250 K/UL
POTASSIUM SERPL-SCNC: 4.6 MMOL/L
PROT SERPL-MCNC: 7.6 G/DL
RBC # BLD: 5.34 M/UL
RBC # FLD: 13.2 %
SODIUM SERPL-SCNC: 142 MMOL/L
T PALLIDUM AB SER QL IA: NEGATIVE
T4 FREE SERPL-MCNC: 1.4 NG/DL
TSH SERPL-ACNC: 1.13 UIU/ML
VIT B12 SERPL-MCNC: 377 PG/ML
WBC # FLD AUTO: 7.68 K/UL

## 2025-09-02 LAB
HOMOCYSTEINE LEVEL: 12 UMOL/L
METHYLMALONATE SERPL-SCNC: 267 NMOL/L

## 2025-09-04 ENCOUNTER — APPOINTMENT (OUTPATIENT)
Dept: NEUROSURGERY | Facility: CLINIC | Age: 50
End: 2025-09-04
Payer: COMMERCIAL

## 2025-09-04 ENCOUNTER — OUTPATIENT (OUTPATIENT)
Dept: OUTPATIENT SERVICES | Facility: HOSPITAL | Age: 50
LOS: 1 days | End: 2025-09-04
Payer: COMMERCIAL

## 2025-09-04 ENCOUNTER — APPOINTMENT (OUTPATIENT)
Dept: RADIOLOGY | Facility: CLINIC | Age: 50
End: 2025-09-04
Payer: COMMERCIAL

## 2025-09-04 VITALS
OXYGEN SATURATION: 98 % | RESPIRATION RATE: 16 BRPM | HEART RATE: 100 BPM | SYSTOLIC BLOOD PRESSURE: 124 MMHG | BODY MASS INDEX: 32.34 KG/M2 | WEIGHT: 231 LBS | DIASTOLIC BLOOD PRESSURE: 86 MMHG | HEIGHT: 71 IN

## 2025-09-04 DIAGNOSIS — G89.29 LOW BACK PAIN, UNSPECIFIED: ICD-10-CM

## 2025-09-04 DIAGNOSIS — M54.2 CERVICALGIA: ICD-10-CM

## 2025-09-04 DIAGNOSIS — Z98.890 OTHER SPECIFIED POSTPROCEDURAL STATES: Chronic | ICD-10-CM

## 2025-09-04 DIAGNOSIS — M54.50 LOW BACK PAIN, UNSPECIFIED: ICD-10-CM

## 2025-09-04 PROCEDURE — 72110 X-RAY EXAM L-2 SPINE 4/>VWS: CPT | Mod: 26

## 2025-09-04 PROCEDURE — 72110 X-RAY EXAM L-2 SPINE 4/>VWS: CPT

## 2025-09-04 PROCEDURE — 73502 X-RAY EXAM HIP UNI 2-3 VIEWS: CPT | Mod: 26,RT

## 2025-09-04 PROCEDURE — 73502 X-RAY EXAM HIP UNI 2-3 VIEWS: CPT

## 2025-09-04 PROCEDURE — 99214 OFFICE O/P EST MOD 30 MIN: CPT

## 2025-09-15 ENCOUNTER — APPOINTMENT (OUTPATIENT)
Dept: NEUROSURGERY | Facility: CLINIC | Age: 50
End: 2025-09-15

## 2025-09-26 PROBLEM — Z12.5 SCREENING PSA (PROSTATE SPECIFIC ANTIGEN): Status: ACTIVE | Noted: 2025-09-26

## (undated) DEVICE — SUT MAXON 0 30" GS-22

## (undated) DEVICE — SOL IRR POUR H2O 250ML

## (undated) DEVICE — SYR LUER LOK 10CC

## (undated) DEVICE — BLADE SCALPEL SAFETYLOCK #15

## (undated) DEVICE — MEDICATION LABELS W MARKER

## (undated) DEVICE — SUT MONOCRYL 4-0 27" PS-2 UNDYED

## (undated) DEVICE — LIGASURE EXACT DISSECTOR

## (undated) DEVICE — DRAPE TOWEL BLUE 17" X 24"

## (undated) DEVICE — PREP CHLORAPREP HI-LITE ORANGE 26ML

## (undated) DEVICE — VENODYNE/SCD SLEEVE CALF MEDIUM

## (undated) DEVICE — DRAPE INSTRUMENT POUCH 6.75" X 11"

## (undated) DEVICE — DRSG MASTISOL

## (undated) DEVICE — SUT SURGIPRO 1 30" GS-21

## (undated) DEVICE — SPECIMEN CONTAINER 100ML

## (undated) DEVICE — SOL IRR POUR NS 0.9% 500ML

## (undated) DEVICE — DRSG STERISTRIPS 0.5 X 4"

## (undated) DEVICE — WARMING BLANKET UPPER ADULT

## (undated) DEVICE — DRAPE IOBAN 33" X 23"

## (undated) DEVICE — SUT POLYSORB 3-0 30" V-20 UNDYED

## (undated) DEVICE — GLV 7.5 PROTEXIS (WHITE)

## (undated) DEVICE — DRSG TEGADERM + PAD 3.5X4"

## (undated) DEVICE — PACK ADULT HERNIA